# Patient Record
Sex: FEMALE | Race: WHITE
[De-identification: names, ages, dates, MRNs, and addresses within clinical notes are randomized per-mention and may not be internally consistent; named-entity substitution may affect disease eponyms.]

---

## 2020-10-03 ENCOUNTER — HOSPITAL ENCOUNTER (INPATIENT)
Dept: HOSPITAL 33 - ED | Age: 75
LOS: 7 days | Discharge: HOME HEALTH | DRG: 640 | End: 2020-10-10
Attending: INTERNAL MEDICINE | Admitting: INTERNAL MEDICINE
Payer: MEDICARE

## 2020-10-03 DIAGNOSIS — E78.00: ICD-10-CM

## 2020-10-03 DIAGNOSIS — E11.69: ICD-10-CM

## 2020-10-03 DIAGNOSIS — R53.1: ICD-10-CM

## 2020-10-03 DIAGNOSIS — N17.9: ICD-10-CM

## 2020-10-03 DIAGNOSIS — I10: ICD-10-CM

## 2020-10-03 DIAGNOSIS — E87.2: Primary | ICD-10-CM

## 2020-10-03 DIAGNOSIS — Z79.899: ICD-10-CM

## 2020-10-03 DIAGNOSIS — K85.90: ICD-10-CM

## 2020-10-03 DIAGNOSIS — E86.0: ICD-10-CM

## 2020-10-03 LAB
ALBUMIN SERPL-MCNC: 3.1 G/DL (ref 3.5–5)
ALBUMIN SERPL-MCNC: 4.3 G/DL (ref 3.5–5)
ALP SERPL-CCNC: 258 U/L (ref 38–126)
ALP SERPL-CCNC: 372 U/L (ref 38–126)
ALT SERPL-CCNC: 20 U/L (ref 0–35)
ALT SERPL-CCNC: 27 U/L (ref 0–35)
ANION GAP SERPL CALC-SCNC: 18.3 MEQ/L (ref 5–15)
ANION GAP SERPL CALC-SCNC: 26.1 MEQ/L (ref 5–15)
AST SERPL QL: 20 U/L (ref 14–36)
AST SERPL QL: 26 U/L (ref 14–36)
BASOPHILS # BLD AUTO: 0.03 10*3/UL (ref 0–0.4)
BASOPHILS NFR BLD AUTO: 0.3 % (ref 0–0.4)
BILIRUB BLD-MCNC: 0.3 MG/DL (ref 0.2–1.3)
BILIRUB BLD-MCNC: 0.4 MG/DL (ref 0.2–1.3)
BUN SERPL-MCNC: 101 MG/DL (ref 7–17)
BUN SERPL-MCNC: 91 MG/DL (ref 7–17)
CALCIUM SPEC-MCNC: 10.1 MG/DL (ref 8.4–10.2)
CALCIUM SPEC-MCNC: 8.3 MG/DL (ref 8.4–10.2)
CHLORIDE SERPL-SCNC: 111 MMOL/L (ref 98–107)
CHLORIDE SERPL-SCNC: 99 MMOL/L (ref 98–107)
CO2 SERPL-SCNC: 13 MMOL/L (ref 22–30)
CO2 SERPL-SCNC: 14 MMOL/L (ref 22–30)
CREAT SERPL-MCNC: 10.32 MG/DL (ref 0.52–1.04)
CREAT SERPL-MCNC: 8.34 MG/DL (ref 0.52–1.04)
EOSINOPHIL # BLD AUTO: 0.1 10*3/UL (ref 0–0.5)
GFR SERPLBLD BASED ON 1.73 SQ M-ARVRAT: 3.9 ML/MIN
GFR SERPLBLD BASED ON 1.73 SQ M-ARVRAT: 5 ML/MIN
GLUCOSE SERPL-MCNC: 107 MG/DL (ref 74–106)
GLUCOSE SERPL-MCNC: 173 MG/DL (ref 74–106)
GLUCOSE UR-MCNC: 50 MG/DL
HCT VFR BLD AUTO: 33.3 % (ref 35–47)
HGB BLD-MCNC: 10.3 GM/DL (ref 12–16)
LIPASE SERPL-CCNC: 1055 U/L (ref 23–300)
LYMPHOCYTES # SPEC AUTO: 1.63 10*3/UL (ref 1–4.6)
MCH RBC QN AUTO: 30.4 PG (ref 26–32)
MCHC RBC AUTO-ENTMCNC: 30.9 G/DL (ref 32–36)
MONOCYTES # BLD AUTO: 0.39 10*3/UL (ref 0–1.3)
PLATELET # BLD AUTO: 295 K/MM3 (ref 150–450)
POTASSIUM SERPLBLD-SCNC: 4.2 MMOL/L (ref 3.5–5.1)
POTASSIUM SERPLBLD-SCNC: 4.5 MMOL/L (ref 3.5–5.1)
PROT SERPL-MCNC: 5.6 G/DL (ref 6.3–8.2)
PROT SERPL-MCNC: 7.4 G/DL (ref 6.3–8.2)
PROT UR STRIP-MCNC: 100 MG/DL
RBC # BLD AUTO: 3.39 M/MM3 (ref 4.1–5.4)
RBC #/AREA URNS HPF: (no result) /HPF (ref 0–2)
SODIUM SERPL-SCNC: 135 MMOL/L (ref 137–145)
SODIUM SERPL-SCNC: 138 MMOL/L (ref 137–145)
WBC # BLD AUTO: 9.9 K/MM3 (ref 4–10.5)
WBC #/AREA URNS HPF: (no result) /HPF (ref 0–5)

## 2020-10-03 PROCEDURE — 74176 CT ABD & PELVIS W/O CONTRAST: CPT

## 2020-10-03 PROCEDURE — 99285 EMERGENCY DEPT VISIT HI MDM: CPT

## 2020-10-03 PROCEDURE — 85027 COMPLETE CBC AUTOMATED: CPT

## 2020-10-03 PROCEDURE — 83036 HEMOGLOBIN GLYCOSYLATED A1C: CPT

## 2020-10-03 PROCEDURE — 94760 N-INVAS EAR/PLS OXIMETRY 1: CPT

## 2020-10-03 PROCEDURE — 83605 ASSAY OF LACTIC ACID: CPT

## 2020-10-03 PROCEDURE — 96375 TX/PRO/DX INJ NEW DRUG ADDON: CPT

## 2020-10-03 PROCEDURE — 96374 THER/PROPH/DIAG INJ IV PUSH: CPT

## 2020-10-03 PROCEDURE — 36600 WITHDRAWAL OF ARTERIAL BLOOD: CPT

## 2020-10-03 PROCEDURE — 82803 BLOOD GASES ANY COMBINATION: CPT

## 2020-10-03 PROCEDURE — 81001 URINALYSIS AUTO W/SCOPE: CPT

## 2020-10-03 PROCEDURE — 83690 ASSAY OF LIPASE: CPT

## 2020-10-03 PROCEDURE — 96360 HYDRATION IV INFUSION INIT: CPT

## 2020-10-03 PROCEDURE — 36000 PLACE NEEDLE IN VEIN: CPT

## 2020-10-03 PROCEDURE — 85025 COMPLETE CBC W/AUTO DIFF WBC: CPT

## 2020-10-03 PROCEDURE — 83735 ASSAY OF MAGNESIUM: CPT

## 2020-10-03 PROCEDURE — 80053 COMPREHEN METABOLIC PANEL: CPT

## 2020-10-03 PROCEDURE — 99291 CRITICAL CARE FIRST HOUR: CPT

## 2020-10-03 PROCEDURE — 94762 N-INVAS EAR/PLS OXIMTRY CONT: CPT

## 2020-10-03 PROCEDURE — 36415 COLL VENOUS BLD VENIPUNCTURE: CPT

## 2020-10-03 PROCEDURE — 82150 ASSAY OF AMYLASE: CPT

## 2020-10-03 PROCEDURE — 84484 ASSAY OF TROPONIN QUANT: CPT

## 2020-10-03 PROCEDURE — 87086 URINE CULTURE/COLONY COUNT: CPT

## 2020-10-03 PROCEDURE — 82375 ASSAY CARBOXYHB QUANT: CPT

## 2020-10-03 PROCEDURE — 82962 GLUCOSE BLOOD TEST: CPT

## 2020-10-03 PROCEDURE — 87040 BLOOD CULTURE FOR BACTERIA: CPT

## 2020-10-03 PROCEDURE — 71045 X-RAY EXAM CHEST 1 VIEW: CPT

## 2020-10-03 RX ADMIN — METOPROLOL SUCCINATE SCH: 50 TABLET, EXTENDED RELEASE ORAL at 21:01

## 2020-10-03 RX ADMIN — ONDANSETRON PRN MG: 2 INJECTION, SOLUTION INTRAMUSCULAR; INTRAVENOUS at 23:08

## 2020-10-03 RX ADMIN — ONDANSETRON PRN MG: 2 INJECTION, SOLUTION INTRAMUSCULAR; INTRAVENOUS at 16:43

## 2020-10-03 RX ADMIN — METOPROLOL SUCCINATE SCH: 50 TABLET, EXTENDED RELEASE ORAL at 14:35

## 2020-10-03 RX ADMIN — ASPIRIN SCH: 81 TABLET, COATED ORAL at 14:35

## 2020-10-03 RX ADMIN — ACETAMINOPHEN PRN MG: 325 TABLET ORAL at 16:43

## 2020-10-03 NOTE — ERPHSYRPT
- History of Present Illness


Time Seen by Provider: 10/03/20 11:11


Source: patient


Exam Limitations: no limitations


Patient Subjective Stated Complaint: Pt has been sick for approx 3 weeks, been 

vomiting for about a week, diarhea, body aches, dizzy, last intake yesterday v

adonay minimal,


Triage Nursing Assessment: Pt brought to the ER by her daughter, A&O, weak, 

dizzy, nauseaous, denies pain, hypotensive, pulses normal, skin n/c/d


Physician History: 





Pt has been sick for approx 3 weeks, been vomiting for about a week, diarhea, 

body aches, dizzy, last intake yesterday very minimal, denies any fever 

shortness of breath or chest pain





Timing/Duration: week(s) (three)


Severity: moderate


Associated Symptoms: nausea, vomiting, abdominal pain, loss of appetite, 

weakness


Allergies/Adverse Reactions: 








No Known Drug Allergies Allergy (Verified 10/03/20 10:37)


   





Home Medications: 








Aspirin EC 81 mg*** [Ecotrin 81 mg***] 81 mg PO DAILY 11/01/13 [History]


Metformin HCl 1000 mg [Glucophage 1000 MG] 1,000 mg PO BID 11/01/13 [History]


Metoprolol Succinate 50 mg*** [Toprol Xl 50 MG***] 50 mg PO BID 11/01/13 

[History]


Glucosam/Chondr/Collagn/Hyalur [Glucosamine & Chondroitin Cap] 1 tab PO BID 11 /04/13 [History]


Simvastatin 40 mg PO DAILY 11/04/13 [History]


Empagliflozin [Jardiance] 10 mg PO QAM 10/03/20 [History]


Lisinopril/Hydrochlorothiazide [Lisinopril-Hctz 10-12.5 mg Tab] 1 tab PO DAILY 

10/03/20 [History]


Ondansetron HCl 4 mg SL Q8H 10/03/20 [History]


allopurinoL [Allopurinol] 300 mg PO BID 10/03/20 [History]





Hx Influenza Vaccination/Date Given: No


Hx Pneumococcal Vaccination/Date Given: No





Travel Risk





- International Travel


Have you traveled outside of the country in past 3 weeks: No





- Coronavirus Screening


Are you exhibiting any of the following symptoms?: Yes


Symptoms: Vomiting/Diarrhea, Headaches/Body Aches/Fatigue


Close contact with a COVID-19 positive Pt in past 14-21 Days: No





- Review of Systems


Constitutional: Weakness, No Fever, No Chills


Eyes: No Symptoms


Ears, Nose, & Throat: No Symptoms


Respiratory: No Cough, No Dyspnea


Cardiac: No Chest Pain, No Edema, No Syncope


Abdominal/Gastrointestinal: Abdominal Pain, Nausea, Vomiting, Appetite Changes, 

No Diarrhea


Genitourinary Symptoms: No Dysuria


Musculoskeletal: No Back Pain, No Neck Pain


Skin: No Rash


Neurological: No Dizziness, No Focal Weakness, No Sensory Changes


Psychological: No Symptoms


Endocrine: No Symptoms


All Other Systems: Reviewed and Negative





- Past Medical History


Pertinent Past Medical History: Yes


Neurological History: No Pertinent History


ENT History: No Pertinent History


Cardiac History: High Cholesterol, Hypertension


Respiratory History: No Pertinent History


Endocrine Medical History: Diabetes Type II, Other


Musculoskeletal History: Osteoarthritis


GI Medical History: Gallbladder Disease


 History: No Pertinent History


Psycho-Social History: No Pertinent History


Female Reproductive Disorders: Abnormal Uterine Bleeding


Other Medical History: liver enlarged and thryoid nodules





- Past Surgical History


Past Surgical History: Yes


Neuro Surgical History: No Pertinent History


Cardiac: No Pertinent History


Respiratory: No Pertinent History


Gastrointestinal: Cholecystectomy


Genitourinary: No Pertinent History


Musculoskeletal: No Pertinent History


Female Surgical History: Hysterectomy





- Social History


Smoking Status: Never smoker


Exposure to second hand smoke: No


Drug Use: none


Patient Lives Alone: Yes





- Female History


Hx Pregnant Now: No





- Nursing Vital Signs


Nursing Vital Signs: 


                               Initial Vital Signs











Temperature  98.3 F   10/03/20 10:22


 


Pulse Rate  78   10/03/20 10:22


 


Blood Pressure  88/45   10/03/20 10:22


 


O2 Sat by Pulse Oximetry  100   10/03/20 10:22








                                   Pain Scale











Pain Intensity                 0

















- Physical Exam


General Appearance: no apparent distress, alert


Eye Exam: PERRL/EOMI, eyes nml inspection


Ears, Nose, Throat Exam: normal ENT inspection, TMs normal, pharynx normal, 

moist mucous membranes


Neck Exam: normal inspection, non-tender, supple, full range of motion


Respiratory Exam: normal breath sounds, lungs clear, No respiratory distress


Cardiovascular Exam: regular rate/rhythm, normal heart sounds, normal peripheral

 pulses


Gastrointestinal/Abdomen Exam: soft, normal bowel sounds, No tenderness, No mass


Back Exam: normal inspection, normal range of motion, No CVA tenderness, No 

vertebral tenderness


Extremity Exam: normal inspection, normal range of motion, pelvis stable


Neurologic Exam: alert, oriented x 3, cooperative, normal mood/affect, nml 

cerebellar function, nml station & gait, sensation nml, No motor deficits


Skin Exam: normal color, warm, dry, No rash


Lymphatic Exam: No adenopathy


SpO2: 99





- Course


Nursing assessment & vital signs reviewed: Yes


Ordered Tests: 


                               Active Orders 24 hr











 Category Date Time Status


 


 IV Insertion STAT Care  10/03/20 11:04 Active


 


 IV Insertion-2nd Peripheral STAT Care  10/03/20 11:04 Active


 


 CBC W DIFF Stat Lab  10/03/20 11:00 Completed


 


 CMP Stat Lab  10/03/20 11:00 Completed


 


 CULTURE,URINE Stat Lab  10/03/20 11:02 Received


 


 LIPASE Stat Lab  10/03/20 11:00 Completed


 


 Lactic Acid Stat Lab  10/03/20 11:03 Completed


 


 TROPONIN Q3H Lab  10/03/20 11:00 Completed


 


 TROPONIN Q3H Lab  10/03/20 13:30 Ordered


 


 TROPONIN Q3H Lab  10/03/20 16:30 Ordered


 


 TROPONIN Q3H Lab  10/03/20 19:30 Ordered


 


 TROPONIN Q3H Lab  10/03/20 22:30 Ordered


 


 UA W/RFX UR CULTURE Stat Lab  10/03/20 11:02 Completed


 


 Transfer Order Routine Transfer  10/03/20 Ordered








Medication Summary











Generic Name Dose Route Start Last Admin





  Trade Name Freq  PRN Reason Stop Dose Admin


 


Sodium Chloride  1,000 mls @ 999 mls/hr  10/03/20 11:51  10/03/20 11:52





  Sodium Chloride 0.9% 1000 Ml  IV  10/03/20 12:51  999 mls/hr





  .Q1H1M STA   Administration














Discontinued Medications














Generic Name Dose Route Start Last Admin





  Trade Name Freq  PRN Reason Stop Dose Admin


 


Sodium Chloride  1,000 mls @ 999 mls/hr  10/03/20 10:20  10/03/20 11:50





  Sodium Chloride 0.9% 1000 Ml  IV  10/03/20 11:20  Infused





  .Q1H1M STA   Infusion


 


Sodium Chloride  Confirm  10/03/20 10:46 





  Sodium Chloride 0.9% 1000 Ml  Administered  10/03/20 10:47 





  Dose  





  1,000 mls @ ud  





  .ROUTE  





  .STK-MED ONE  


 


Sodium Chloride  Confirm  10/03/20 11:32 





  Sodium Chloride 0.9% 1000 Ml  Administered  10/03/20 11:33 





  Dose  





  1,000 mls @ ud  





  .ROUTE  





  .STK-MED ONE  


 


Ondansetron HCl  4 mg  10/03/20 10:20  10/03/20 10:49





  Zofran 4 Mg/2 Ml Vial**  IV  10/03/20 10:21  4 mg





  STAT ONE   Administration


 


Ondansetron HCl  Confirm  10/03/20 10:46 





  Zofran 4 Mg/2 Ml Vial**  Administered  10/03/20 10:47 





  Dose  





  4 mg  





  .ROUTE  





  .STK-MED ONE  


 


Pantoprazole Sodium  40 mg  10/03/20 10:20  10/03/20 10:49





  Protonix 40 Mg Iv***  IV  10/03/20 10:21  40 mg





  STAT ONE   Administration


 


Pantoprazole Sodium  Confirm  10/03/20 10:46 





  Protonix 40 Mg Iv***  Administered  10/03/20 10:47 





  Dose  





  40 mg  





  IV  





  .K-MED ONE  











Lab/Rad Data: 


                           Laboratory Result Diagrams





                                 10/03/20 11:00 





                                 10/03/20 11:00 





                               Laboratory Results











  10/03/20 10/03/20 10/03/20 Range/Units





  11:03 11:02 11:00 


 


WBC     (4.0-10.5)  K/mm3


 


RBC     (4.1-5.4)  M/mm3


 


Hgb     (12.0-16.0)  gm/dl


 


Hct     (35-47)  %


 


MCV     ()  fl


 


MCH     (26-32)  pg


 


MCHC     (32-36)  g/dl


 


RDW     (11.5-14.0)  %


 


Plt Count     (150-450)  K/mm3


 


MPV     (7.5-11.0)  fl


 


Gran %     (36.0-66.0)  %


 


Eos # (Auto)     (0-0.5)  


 


Absolute Lymphs (auto)     (1.0-4.6)  


 


Absolute Monos (auto)     (0.0-1.3)  


 


Lymphocytes %     (24.0-44.0)  %


 


Monocytes %     (0.0-12.0)  %


 


Eosinophils %     (0.00-5.0)  %


 


Basophils %     (0.0-0.4)  %


 


Absolute Granulocytes     (1.4-6.9)  


 


Basophils #     (0-0.4)  


 


Sodium     (137-145)  mmol/L


 


Potassium     (3.5-5.1)  mmol/L


 


Chloride     ()  mmol/L


 


Carbon Dioxide     (22-30)  mmol/L


 


Anion Gap     (5-15)  MEQ/L


 


BUN     (7-17)  mg/dL


 


Creatinine     (0.52-1.04)  mg/dL


 


Estimated GFR     ML/MIN


 


Glucose     ()  mg/dL


 


Lactic Acid  1.6    (0.4-2.0)  


 


Calcium     (8.4-10.2)  mg/dL


 


Total Bilirubin     (0.2-1.3)  mg/dL


 


AST     (14-36)  U/L


 


ALT     (0-35)  U/L


 


Alkaline Phosphatase     ()  U/L


 


Troponin I    0.016  (0.000-0.034)  ng/mL


 


Serum Total Protein     (6.3-8.2)  g/dL


 


Albumin     (3.5-5.0)  g/dL


 


Lipase     ()  U/L


 


Urine Color   YELLOW   (YELLOW)  


 


Urine Appearance   SLIGHTLY CLOUDY   (CLEAR)  


 


Urine pH   5.0   (5-6)  


 


Ur Specific Gravity   1.016   (1.005-1.025)  


 


Urine Protein   100   (Negative)  


 


Urine Ketones   TRACE   (NEGATIVE)  


 


Urine Blood   MODERATE   (0-5)  Adonay/ul


 


Urine Nitrite   NEGATIVE   (NEGATIVE)  


 


Urine Bilirubin   SMALL   (NEGATIVE)  


 


Urine Urobilinogen   2   (0-1)  mg/dL


 


Ur Leukocyte Esterase   SMALL   (NEGATIVE)  


 


Urine WBC (Auto)   6-10   (0-5)  /HPF


 


Urine RBC (Auto)   0-2   (0-2)  /HPF


 


U Hyaline Cast (Auto)   6-10   (0-2)  /LPF


 


U Epithel Cells (Auto)   RARE   (FEW)  /HPF


 


Urine Bacteria (Auto)   FEW   (NEGATIVE)  /HPF


 


Urine Mucus (Auto)   SLIGHT   (NEGATIVE)  /HPF


 


Urine Culture Reflexed   YES   (NO)  


 


Urine Glucose   50   (NEGATIVE)  mg/dL














  10/03/20 10/03/20 Range/Units





  11:00 11:00 


 


WBC   9.9  (4.0-10.5)  K/mm3


 


RBC   3.39 L  (4.1-5.4)  M/mm3


 


Hgb   10.3 L  (12.0-16.0)  gm/dl


 


Hct   33.3 L  (35-47)  %


 


MCV   98.2  ()  fl


 


MCH   30.4  (26-32)  pg


 


MCHC   30.9 L  (32-36)  g/dl


 


RDW   15.3 H  (11.5-14.0)  %


 


Plt Count   295  (150-450)  K/mm3


 


MPV   11.1 H  (7.5-11.0)  fl


 


Gran %   78.4 H  (36.0-66.0)  %


 


Eos # (Auto)   0.10  (0-0.5)  


 


Absolute Lymphs (auto)   1.63  (1.0-4.6)  


 


Absolute Monos (auto)   0.39  (0.0-1.3)  


 


Lymphocytes %   16.4 L  (24.0-44.0)  %


 


Monocytes %   3.9  (0.0-12.0)  %


 


Eosinophils %   1.0  (0.00-5.0)  %


 


Basophils %   0.3  (0.0-0.4)  %


 


Absolute Granulocytes   7.78 H  (1.4-6.9)  


 


Basophils #   0.03  (0-0.4)  


 


Sodium  135 L   (137-145)  mmol/L


 


Potassium  4.5   (3.5-5.1)  mmol/L


 


Chloride  99   ()  mmol/L


 


Carbon Dioxide  14 L*   (22-30)  mmol/L


 


Anion Gap  26.1 H   (5-15)  MEQ/L


 


BUN  101 H   (7-17)  mg/dL


 


Creatinine  10.32 H   (0.52-1.04)  mg/dL


 


Estimated GFR  3.9   ML/MIN


 


Glucose  173 H   ()  mg/dL


 


Lactic Acid    (0.4-2.0)  


 


Calcium  10.1   (8.4-10.2)  mg/dL


 


Total Bilirubin  0.40   (0.2-1.3)  mg/dL


 


AST  26   (14-36)  U/L


 


ALT  27   (0-35)  U/L


 


Alkaline Phosphatase  372 H   ()  U/L


 


Troponin I    (0.000-0.034)  ng/mL


 


Serum Total Protein  7.4   (6.3-8.2)  g/dL


 


Albumin  4.3   (3.5-5.0)  g/dL


 


Lipase  1055 H   ()  U/L


 


Urine Color    (YELLOW)  


 


Urine Appearance    (CLEAR)  


 


Urine pH    (5-6)  


 


Ur Specific Gravity    (1.005-1.025)  


 


Urine Protein    (Negative)  


 


Urine Ketones    (NEGATIVE)  


 


Urine Blood    (0-5)  Adonay/ul


 


Urine Nitrite    (NEGATIVE)  


 


Urine Bilirubin    (NEGATIVE)  


 


Urine Urobilinogen    (0-1)  mg/dL


 


Ur Leukocyte Esterase    (NEGATIVE)  


 


Urine WBC (Auto)    (0-5)  /HPF


 


Urine RBC (Auto)    (0-2)  /HPF


 


U Hyaline Cast (Auto)    (0-2)  /LPF


 


U Epithel Cells (Auto)    (FEW)  /HPF


 


Urine Bacteria (Auto)    (NEGATIVE)  /HPF


 


Urine Mucus (Auto)    (NEGATIVE)  /HPF


 


Urine Culture Reflexed    (NO)  


 


Urine Glucose    (NEGATIVE)  mg/dL














- Progress


Progress: improved


Discussed with : Beni


Will see patient in: hospital (full admit)


Counseled pt/family regarding: lab results, diagnosis, need for follow-up





- Departure


Departure Disposition: In-patient Admission


Clinical Impression: 


 Dehydration, Metabolic acidosis due to diabetes mellitus





Acute renal failure syndrome


Qualifiers:


 Acute renal failure type: unspecified Qualified Code(s): N17.9 - Acute kidney f

ailure, unspecified





Condition: Fair


Critical Care Time: Yes


Critical Care Time(excluding separately billable procedures): Critical 30-74 

mins


Referrals: 


MARCIANO GIL MD [Primary Care Provider] -

## 2020-10-03 NOTE — PCM.HP
History of Present Illness





- Chief Complaint


Chief Complaint: DEHYDRATION, ARF


History of Present Illness: 


 is a 75 year old female.Pt brought to the ER by her daughter, A&O, 

weak, dizzy, nauseaous, denies pain, 





Pt has been sick for approx 3 weeks, been vomiting for about a week, diarhea, 

body aches, dizzy, last intake yesterday very minimal, denies any fever 

shortness of breath or chest pain





Timing/Duration: week(s) (three)


Severity: moderate


Associated Symptoms: nausea, vomiting, abdominal pain, loss of appetite, 

weakness








- Review of Systems


Constitutional: Lethargy, Weakness, No Fever, No Chills


Eyes: No Symptoms


Ears, Nose, & Throat: No Symptoms


Respiratory: No Cough, No Short Of Breath


Cardiac: No Chest Pain, No Edema, No Syncope


Abdominal/Gastrointestinal: No Abdominal Pain, No Nausea, No Vomiting, No 

Diarrhea


Genitourinary Symptoms: No Dysuria


Musculoskeletal: No Back Pain, No Neck Pain


Skin: No Rash


Neurological: No Dizziness, No Focal Weakness, No Sensory Changes


Psychological: No Symptoms


Endocrine: No Symptoms


Hematologic/Lymphatic: No Symptoms


Immunological/Allergic: No Symptoms





Medications & Allergies


Home Medications: 


                              Home Medication List





Aspirin EC 81 mg*** [Ecotrin 81 mg***] 81 mg PO DAILY 11/01/13 [History 

Confirmed 10/03/20]


Metformin HCl 1000 mg [Glucophage 1000 MG] 1,000 mg PO BID 11/01/13 [History 

Confirmed 10/03/20]


Metoprolol Succinate 50 mg*** [Toprol Xl 50 MG***] 50 mg PO BID 11/01/13 

[History Confirmed 10/03/20]


Empagliflozin [Jardiance] 10 mg PO QAM 10/03/20 [History Confirmed 10/03/20]


Lisinopril/Hydrochlorothiazide [Lisinopril-Hctz 10-12.5 mg Tab] 1 tab PO DAILY 

10/03/20 [History Confirmed 10/03/20]


Ondansetron HCl 4 mg SL Q8H 10/03/20 [History Confirmed 10/03/20]


Simvastatin 40 mg PO HS 10/03/20 [History Confirmed 10/03/20]


allopurinoL [Allopurinol] 300 mg PO BID 10/03/20 [History Confirmed 10/03/20]








Allergies/Adverse Reactions: 


                                    Allergies











Allergy/AdvReac Type Severity Reaction Status Date / Time


 


No Known Drug Allergies Allergy   Verified 10/03/20 10:37














- Past Medical History


Past Medical History: Yes


Neurological History: No Pertinent History


ENT History: No Pertinent History


Cardiac History: High Cholesterol, Hypertension


Respiratory History: No Pertinent History


Endocrine Medical History: Diabetes Type II, Other


Musculoskelatal History: Osteoarthritis


GI Medical History: Gallbladder Disease


 History: No Pertinent History


Pyscho-Social History: No Pertinent History


Reproductive Disorders: Abnormal Uterine Bleeding


Comment: liver enlarged and thryoid nodules





- Female History


Are you pregnant now?: No





- Past Surgical History


Past Surgical History: Yes


Neuro Surgical History: No Pertinent History


Cardiac History: No Pertinent History


Respiratory Surgery: No Pertinent History


GI Surgical History: Cholecystectomy


Genitourinary Surgical Hx: No Pertinent History


Musculskeletal Surgical Hx: No Pertinent History


Female Surgical History: Hysterectomy





- Social History


Smoking Status: Never smoker


Exposure to second hand smoke: No


Alcohol: None


Drug Use: none





- Physical Exam


Vital Signs: 


                               Vital Signs - 24 hr











  Temp Pulse Resp BP Pulse Ox


 


 10/03/20 13:31      94 L


 


 10/03/20 12:20  98.5 F  69  16  91/44  98


 


 10/03/20 12:01   68  16  84/39  94 L


 


 10/03/20 11:56      99


 


 10/03/20 11:05   72  18  83/40  99


 


 10/03/20 10:22  98.3 F  78   88/45  100











General Appearance: no apparent distress, alert


Neurologic Exam: alert, oriented x 3, cooperative, normal mood/affect, nml 

cerebellar function, nml station & gait, sensation nml, No motor deficits


Eye Exam: PERRL/EOMI, eyes nml inspection


Ears, Nose, Throat Exam: normal ENT inspection, TMs normal, pharynx normal, 

moist mucous membranes


Neck Exam: normal inspection, non-tender, supple, full range of motion


Respiratory Exam: normal breath sounds, lungs clear, No respiratory distress


Cardiovascular Exam: regular rate/rhythm, normal heart sounds, normal peripheral

pulses


Gastrointestinal/Abdomen Exam: soft, normal bowel sounds, No tenderness, No mass


Back Exam: normal inspection, normal range of motion, No CVA tenderness, No 

vertebral tenderness


Extremity Exam: normal inspection, normal range of motion, pelvis stable


Skin Exam: normal color, warm, dry, No rash


Lymphatic Exam: No adenopathy





Results





- Labs


Lab/Micro Results: 


                            Lab Results-Last 24 Hours











  10/03/20 10/03/20 10/03/20 Range/Units





  11:00 11:00 11:00 


 


WBC  9.9    (4.0-10.5)  K/mm3


 


RBC  3.39 L    (4.1-5.4)  M/mm3


 


Hgb  10.3 L    (12.0-16.0)  gm/dl


 


Hct  33.3 L    (35-47)  %


 


MCV  98.2    ()  fl


 


MCH  30.4    (26-32)  pg


 


MCHC  30.9 L    (32-36)  g/dl


 


RDW  15.3 H    (11.5-14.0)  %


 


Plt Count  295    (150-450)  K/mm3


 


MPV  11.1 H    (7.5-11.0)  fl


 


Gran %  78.4 H    (36.0-66.0)  %


 


Eos # (Auto)  0.10    (0-0.5)  


 


Absolute Lymphs (auto)  1.63    (1.0-4.6)  


 


Absolute Monos (auto)  0.39    (0.0-1.3)  


 


Lymphocytes %  16.4 L    (24.0-44.0)  %


 


Monocytes %  3.9    (0.0-12.0)  %


 


Eosinophils %  1.0    (0.00-5.0)  %


 


Basophils %  0.3    (0.0-0.4)  %


 


Absolute Granulocytes  7.78 H    (1.4-6.9)  


 


Basophils #  0.03    (0-0.4)  


 


Sodium   135 L   (137-145)  mmol/L


 


Potassium   4.5   (3.5-5.1)  mmol/L


 


Chloride   99   ()  mmol/L


 


Carbon Dioxide   14 L*   (22-30)  mmol/L


 


Anion Gap   26.1 H   (5-15)  MEQ/L


 


BUN   101 H   (7-17)  mg/dL


 


Creatinine   10.32 H   (0.52-1.04)  mg/dL


 


Estimated GFR   3.9   ML/MIN


 


Glucose   173 H   ()  mg/dL


 


Lactic Acid     (0.4-2.0)  


 


Calcium   10.1   (8.4-10.2)  mg/dL


 


Total Bilirubin   0.40   (0.2-1.3)  mg/dL


 


AST   26   (14-36)  U/L


 


ALT   27   (0-35)  U/L


 


Alkaline Phosphatase   372 H   ()  U/L


 


Troponin I    0.016  (0.000-0.034)  ng/mL


 


Serum Total Protein   7.4   (6.3-8.2)  g/dL


 


Albumin   4.3   (3.5-5.0)  g/dL


 


Lipase   1055 H   ()  U/L


 


Urine Color     (YELLOW)  


 


Urine Appearance     (CLEAR)  


 


Urine pH     (5-6)  


 


Ur Specific Gravity     (1.005-1.025)  


 


Urine Protein     (Negative)  


 


Urine Ketones     (NEGATIVE)  


 


Urine Blood     (0-5)  Adonay/ul


 


Urine Nitrite     (NEGATIVE)  


 


Urine Bilirubin     (NEGATIVE)  


 


Urine Urobilinogen     (0-1)  mg/dL


 


Ur Leukocyte Esterase     (NEGATIVE)  


 


Urine WBC (Auto)     (0-5)  /HPF


 


Urine RBC (Auto)     (0-2)  /HPF


 


U Hyaline Cast (Auto)     (0-2)  /LPF


 


U Epithel Cells (Auto)     (FEW)  /HPF


 


Urine Bacteria (Auto)     (NEGATIVE)  /HPF


 


Urine Mucus (Auto)     (NEGATIVE)  /HPF


 


Urine Culture Reflexed     (NO)  


 


Urine Glucose     (NEGATIVE)  mg/dL














  10/03/20 10/03/20 Range/Units





  11:02 11:03 


 


WBC    (4.0-10.5)  K/mm3


 


RBC    (4.1-5.4)  M/mm3


 


Hgb    (12.0-16.0)  gm/dl


 


Hct    (35-47)  %


 


MCV    ()  fl


 


MCH    (26-32)  pg


 


MCHC    (32-36)  g/dl


 


RDW    (11.5-14.0)  %


 


Plt Count    (150-450)  K/mm3


 


MPV    (7.5-11.0)  fl


 


Gran %    (36.0-66.0)  %


 


Eos # (Auto)    (0-0.5)  


 


Absolute Lymphs (auto)    (1.0-4.6)  


 


Absolute Monos (auto)    (0.0-1.3)  


 


Lymphocytes %    (24.0-44.0)  %


 


Monocytes %    (0.0-12.0)  %


 


Eosinophils %    (0.00-5.0)  %


 


Basophils %    (0.0-0.4)  %


 


Absolute Granulocytes    (1.4-6.9)  


 


Basophils #    (0-0.4)  


 


Sodium    (137-145)  mmol/L


 


Potassium    (3.5-5.1)  mmol/L


 


Chloride    ()  mmol/L


 


Carbon Dioxide    (22-30)  mmol/L


 


Anion Gap    (5-15)  MEQ/L


 


BUN    (7-17)  mg/dL


 


Creatinine    (0.52-1.04)  mg/dL


 


Estimated GFR    ML/MIN


 


Glucose    ()  mg/dL


 


Lactic Acid   1.6  (0.4-2.0)  


 


Calcium    (8.4-10.2)  mg/dL


 


Total Bilirubin    (0.2-1.3)  mg/dL


 


AST    (14-36)  U/L


 


ALT    (0-35)  U/L


 


Alkaline Phosphatase    ()  U/L


 


Troponin I    (0.000-0.034)  ng/mL


 


Serum Total Protein    (6.3-8.2)  g/dL


 


Albumin    (3.5-5.0)  g/dL


 


Lipase    ()  U/L


 


Urine Color  YELLOW   (YELLOW)  


 


Urine Appearance  SLIGHTLY CLOUDY   (CLEAR)  


 


Urine pH  5.0   (5-6)  


 


Ur Specific Gravity  1.016   (1.005-1.025)  


 


Urine Protein  100   (Negative)  


 


Urine Ketones  TRACE   (NEGATIVE)  


 


Urine Blood  MODERATE   (0-5)  [place]/ul


 


Urine Nitrite  NEGATIVE   (NEGATIVE)  


 


Urine Bilirubin  SMALL   (NEGATIVE)  


 


Urine Urobilinogen  2   (0-1)  mg/dL


 


Ur Leukocyte Esterase  SMALL   (NEGATIVE)  


 


Urine WBC (Auto)  6-10   (0-5)  /HPF


 


Urine RBC (Auto)  0-2   (0-2)  /HPF


 


U Hyaline Cast (Auto)  6-10   (0-2)  /LPF


 


U Epithel Cells (Auto)  RARE   (FEW)  /HPF


 


Urine Bacteria (Auto)  FEW   (NEGATIVE)  /HPF


 


Urine Mucus (Auto)  SLIGHT   (NEGATIVE)  /HPF


 


Urine Culture Reflexed  YES   (NO)  


 


Urine Glucose  50   (NEGATIVE)  mg/dL














- Other Procedures and Tests


                               Respiratory Therapy





10/03/20 12:25


Oxygen Nasal Cannula 2 lpm 














Assessment/Plan


(1) Acute renal failure syndrome


Current Visit: Yes   Status: Acute   


Qualifiers: 


   Acute renal failure type: unspecified   Qualified Code(s): N17.9 - Acute 

kidney failure, unspecified   


Assessment & Plan: 


                              Abnormal Lab Results











  10/03/20 10/03/20 Range/Units





  11:00 11:00 


 


RBC  3.39 L   (4.1-5.4)  M/mm3


 


Hgb  10.3 L   (12.0-16.0)  gm/dl


 


Hct  33.3 L   (35-47)  %


 


MCHC  30.9 L   (32-36)  g/dl


 


RDW  15.3 H   (11.5-14.0)  %


 


MPV  11.1 H   (7.5-11.0)  fl


 


Gran %  78.4 H   (36.0-66.0)  %


 


Lymphocytes %  16.4 L   (24.0-44.0)  %


 


Absolute Granulocytes  7.78 H   (1.4-6.9)  


 


Sodium   135 L  (137-145)  mmol/L


 


Carbon Dioxide   14 L*  (22-30)  mmol/L


 


Anion Gap   26.1 H  (5-15)  MEQ/L


 


BUN   101 H  (7-17)  mg/dL


 


Creatinine   10.32 H  (0.52-1.04)  mg/dL


 


Glucose   173 H  ()  mg/dL


 


Alkaline Phosphatase   372 H  ()  U/L


 


Lipase   1055 H  ()  U/L

















(2) Pancreatitis


Current Visit: Yes   Status: Acute   


Qualifiers: 


   Chronicity: acute   Pancreatitis type: unspecified pancreatitis type   Acute 

pancreatitis complication: unspecified   Qualified Code(s): K85.90 - Acute pancr

eatitis without necrosis or infection, unspecified   


Code(s): K85.90 - ACUTE PANCREATITIS WITHOUT NECROSIS OR INFECTION, UNSP   





(3) Type 2 diabetes mellitus


Current Visit: Yes   Status: Acute   


Qualifiers: 


   Diabetes mellitus long term insulin use: without long term use   Diabetes 

mellitus complication status: with ketoacidosis   Diabetes mellitus complication

detail: without coma   Qualified Code(s): E11.10 - Type 2 diabetes mellitus with

ketoacidosis without coma   





(4) Metabolic acidosis due to diabetes mellitus


Current Visit: Yes   Status: Acute   Code(s): E11.69 - TYPE 2 DIABETES MELLITUS 

WITH OTHER SPECIFIED COMPLICATION; E87.2 - ACIDOSIS   





(5) Hypertension


Current Visit: Yes   Status: Chronic   


Qualifiers: 


   Hypertension type: essential hypertension   Qualified Code(s): I10 - [place]

tial (primary) hypertension   


Code(s): I10 - ESSENTIAL (PRIMARY) HYPERTENSION   





(6) Dehydration


Current Visit: Yes   Status: Acute   Code(s): E86.0 - DEHYDRATION

## 2020-10-04 LAB
ALBUMIN SERPL-MCNC: 2.7 G/DL (ref 3.5–5)
ALP SERPL-CCNC: 236 U/L (ref 38–126)
ALT SERPL-CCNC: 17 U/L (ref 0–35)
AMYLASE SERPL-CCNC: 169 U/L (ref 30–110)
ANION GAP SERPL CALC-SCNC: 14.6 MEQ/L (ref 5–15)
AST SERPL QL: 19 U/L (ref 14–36)
BASOPHILS # BLD AUTO: 0.03 10*3/UL (ref 0–0.4)
BASOPHILS NFR BLD AUTO: 0.4 % (ref 0–0.4)
BILIRUB BLD-MCNC: 0.3 MG/DL (ref 0.2–1.3)
BUN SERPL-MCNC: 78 MG/DL (ref 7–17)
CALCIUM SPEC-MCNC: 8.2 MG/DL (ref 8.4–10.2)
CHLORIDE SERPL-SCNC: 119 MMOL/L (ref 98–107)
CO2 SERPL-SCNC: 11 MMOL/L (ref 22–30)
CREAT SERPL-MCNC: 6.92 MG/DL (ref 0.52–1.04)
EOSINOPHIL # BLD AUTO: 0.23 10*3/UL (ref 0–0.5)
GFR SERPLBLD BASED ON 1.73 SQ M-ARVRAT: 6.1 ML/MIN
GLUCOSE SERPL-MCNC: 103 MG/DL (ref 74–106)
HCT VFR BLD AUTO: 26.8 % (ref 35–47)
HGB BLD-MCNC: 8.1 GM/DL (ref 12–16)
LIPASE SERPL-CCNC: 1394 U/L (ref 23–300)
LYMPHOCYTES # SPEC AUTO: 2.16 10*3/UL (ref 1–4.6)
MCH RBC QN AUTO: 30.2 PG (ref 26–32)
MCHC RBC AUTO-ENTMCNC: 30.2 G/DL (ref 32–36)
MONOCYTES # BLD AUTO: 0.51 10*3/UL (ref 0–1.3)
PLATELET # BLD AUTO: 183 K/MM3 (ref 150–450)
POTASSIUM SERPLBLD-SCNC: 4 MMOL/L (ref 3.5–5.1)
PROT SERPL-MCNC: 5.2 G/DL (ref 6.3–8.2)
RBC # BLD AUTO: 2.68 M/MM3 (ref 4.1–5.4)
SODIUM SERPL-SCNC: 141 MMOL/L (ref 137–145)
WBC # BLD AUTO: 6.9 K/MM3 (ref 4–10.5)

## 2020-10-04 RX ADMIN — METOPROLOL SUCCINATE SCH: 50 TABLET, EXTENDED RELEASE ORAL at 09:34

## 2020-10-04 RX ADMIN — ACETAMINOPHEN PRN MG: 325 TABLET ORAL at 14:08

## 2020-10-04 RX ADMIN — ONDANSETRON PRN MG: 2 INJECTION, SOLUTION INTRAMUSCULAR; INTRAVENOUS at 15:18

## 2020-10-04 RX ADMIN — PANTOPRAZOLE SODIUM SCH MG: 40 INJECTION, POWDER, FOR SOLUTION INTRAVENOUS at 09:22

## 2020-10-04 RX ADMIN — ACETAMINOPHEN PRN MG: 325 TABLET ORAL at 06:39

## 2020-10-04 RX ADMIN — ASPIRIN SCH MG: 81 TABLET, COATED ORAL at 09:23

## 2020-10-04 RX ADMIN — ONDANSETRON PRN MG: 2 INJECTION, SOLUTION INTRAMUSCULAR; INTRAVENOUS at 20:55

## 2020-10-04 RX ADMIN — CEFTRIAXONE SCH MLS/HR: 1 INJECTION, SOLUTION INTRAVENOUS at 09:23

## 2020-10-04 RX ADMIN — ONDANSETRON PRN MG: 2 INJECTION, SOLUTION INTRAMUSCULAR; INTRAVENOUS at 09:29

## 2020-10-04 RX ADMIN — METOPROLOL SUCCINATE SCH: 50 TABLET, EXTENDED RELEASE ORAL at 20:56

## 2020-10-04 NOTE — XRAY
Indication: Nausea, vomiting, weakness, and elevated amylase/lipase.



Multiple contiguous axial images obtained through the abdomen and pelvis

without contrast as ordered.



Comparison: None



Lung bases demonstrates fibrosis/scarring and minimal bilateral dependent

atelectasis.  No infiltrate or effusion.  Heart is borderline enlarged.  Small

hiatal hernia.



Noncontrasted stomach and bowel loops appear nonobstructed.  Normal appendix.

Scattered sigmoid diverticulosis.  Head and body of the pancreas appears

prominent/edematous with peripancreatic stranding favoring acute pancreatitis.

 Adjacent duodenal circumferential wall thickening presumed reactive.  Tiny

inferior perihepatic and pelvic free fluid.  No walled off fluid collection or

free air.  Previous cholecystectomy and hysterectomy.



Remaining liver, spleen, adrenal glands, kidneys, ureters, and bladder appear

unremarkable for noncontrast exam.  Mild aortoiliac calcifications without AAA.



Osseous structures demonstrates mild osteopenia, mild/moderate degenerative

spondylosis throughout the thoracolumbar spine, and left L5 spondylolysis with

2-3 mm spondylolisthesis.



Impression:

1.  CT findings favoring acute pancreatitis as detailed with tiny free fluid.

No walled off fluid collection or free air.

2.  Incidental small hiatal hernia, sigmoid diverticulosis, and chronic bony

findings.



Comment: Preliminary interpretation was made by [facility].  No critical discrepancy.

## 2020-10-04 NOTE — PCM.NOTE
Date and Time: 10/04/20  0856





Subjective Assessment: 





doing better, BP is running low, urine output improving. still feels weak and 

dizzy





- Review of Systems


Constitutional: Weakness, No Fever, No Chills


Eyes: No Symptoms


Ears, Nose, & Throat: No Symptoms


Respiratory: No Cough, No Short Of Breath


Cardiac: No Chest Pain, No Edema, No Syncope


Abdominal/Gastrointestinal: No Abdominal Pain, No Nausea, No Vomiting, No 

Diarrhea


Genitourinary Symptoms: No Dysuria


Musculoskeletal: No Back Pain, No Neck Pain


Skin: No Rash


Neurological: No Dizziness, No Focal Weakness, No Sensory Changes


Psychological: No Symptoms


Endocrine: No Symptoms


Hematologic/Lymphatic: No Symptoms


Immunological/Allergic: No Symptoms





Objective Exam


General Appearance: no apparent distress, alert


Neurologic Exam: alert, oriented x 3, cooperative, normal mood/affect, nml 

cerebellar function, sensation nml, No motor deficits


Skin Exam: normal color, warm, dry


Eye Exam: PERRL, EOMI, eyes nml inspection


Ears, Nose, Throat Exam: normal ENT inspection, pharynx normal, moist mucous mem

branes


Neck Exam: normal inspection, non-tender, supple, full range of motion


Respiratory Exam: normal breath sounds, lungs clear, No respiratory distress


Cardiovascular Exam: regular rate/rhythm, normal heart sounds


Gastrointestinal/Abdomen Exam: soft, No tenderness, No mass


Extremity Exam: normal inspection, normal range of motion


Back Exam: normal inspection, normal range of motion, No CVA tenderness, No 

vertebral tenderness


Pelvic Exam: deferred


Rectal Exam: deferred





OBJECTIVE DATA


Vital Signs: 


                               Vital Signs - 24 hr











  Temp Pulse Resp BP Pulse Ox


 


 10/04/20 07:01  98.7 F  67  18  91/44  94 L


 


 10/04/20 06:59      94 L


 


 10/04/20 04:00  97.5 F  79  17  83/43  97


 


 10/03/20 23:47  98.0 F  79  16  89/43  96


 


 10/03/20 21:30   71   87/40 


 


 10/03/20 20:11      94 L


 


 10/03/20 20:00  98.2 F  71  17  89/46  98


 


 10/03/20 16:00  98.1 F  79  18  88/47  93 L


 


 10/03/20 13:31      94 L


 


 10/03/20 12:20  98.5 F  69  16  91/44  98


 


 10/03/20 12:01   68  16  84/39  94 L


 


 10/03/20 11:56      99


 


 10/03/20 11:05   72  18  83/40  99


 


 10/03/20 10:22  98.3 F  78   88/45  100








                        Pain Assessment - Last Documented











Pain Intensity                 5


 


Pain Scale Used                0-10 Pain Scale











Intake and Output: 


                                 Intake & Output











 10/01/20 10/02/20 10/03/20 10/04/20





 11:59 11:59 11:59 11:59


 


Intake Total    9716


 


Output Total    1350


 


Balance    8366


 


Weight   80.286 kg 80.3 kg











Lab Results: 


                            Lab Results-Last 24 Hours











  10/03/20 10/03/20 10/03/20 Range/Units





  11:00 11:00 11:00 


 


WBC  9.9    (4.0-10.5)  K/mm3


 


RBC  3.39 L    (4.1-5.4)  M/mm3


 


Hgb  10.3 L    (12.0-16.0)  gm/dl


 


Hct  33.3 L    (35-47)  %


 


MCV  98.2    ()  fl


 


MCH  30.4    (26-32)  pg


 


MCHC  30.9 L    (32-36)  g/dl


 


RDW  15.3 H    (11.5-14.0)  %


 


Plt Count  295    (150-450)  K/mm3


 


MPV  11.1 H    (7.5-11.0)  fl


 


Gran %  78.4 H    (36.0-66.0)  %


 


Eos # (Auto)  0.10    (0-0.5)  


 


Absolute Lymphs (auto)  1.63    (1.0-4.6)  


 


Absolute Monos (auto)  0.39    (0.0-1.3)  


 


Lymphocytes %  16.4 L    (24.0-44.0)  %


 


Monocytes %  3.9    (0.0-12.0)  %


 


Eosinophils %  1.0    (0.00-5.0)  %


 


Basophils %  0.3    (0.0-0.4)  %


 


Absolute Granulocytes  7.78 H    (1.4-6.9)  


 


Basophils #  0.03    (0-0.4)  


 


Sodium   135 L   (137-145)  mmol/L


 


Potassium   4.5   (3.5-5.1)  mmol/L


 


Chloride   99   ()  mmol/L


 


Carbon Dioxide   14 L*   (22-30)  mmol/L


 


Anion Gap   26.1 H   (5-15)  MEQ/L


 


BUN   101 H   (7-17)  mg/dL


 


Creatinine   10.32 H   (0.52-1.04)  mg/dL


 


Estimated GFR   3.9   ML/MIN


 


Glucose   173 H   ()  mg/dL


 


POC Glucometer     (74 to 106)  mg/dL


 


Hemoglobin A1c     (4.5-6.0)  %


 


Lactic Acid     (0.4-2.0)  


 


Calcium   10.1   (8.4-10.2)  mg/dL


 


Total Bilirubin   0.40   (0.2-1.3)  mg/dL


 


AST   26   (14-36)  U/L


 


ALT   27   (0-35)  U/L


 


Alkaline Phosphatase   372 H   ()  U/L


 


Troponin I    0.016  (0.000-0.034)  ng/mL


 


Serum Total Protein   7.4   (6.3-8.2)  g/dL


 


Albumin   4.3   (3.5-5.0)  g/dL


 


Amylase     ()  U/L


 


Lipase   1055 H   ()  U/L


 


Urine Color     (YELLOW)  


 


Urine Appearance     (CLEAR)  


 


Urine pH     (5-6)  


 


Ur Specific Gravity     (1.005-1.025)  


 


Urine Protein     (Negative)  


 


Urine Ketones     (NEGATIVE)  


 


Urine Blood     (0-5)  Adonay/ul


 


Urine Nitrite     (NEGATIVE)  


 


Urine Bilirubin     (NEGATIVE)  


 


Urine Urobilinogen     (0-1)  mg/dL


 


Ur Leukocyte Esterase     (NEGATIVE)  


 


Urine WBC (Auto)     (0-5)  /HPF


 


Urine RBC (Auto)     (0-2)  /HPF


 


U Hyaline Cast (Auto)     (0-2)  /LPF


 


U Epithel Cells (Auto)     (FEW)  /HPF


 


Urine Bacteria (Auto)     (NEGATIVE)  /HPF


 


Urine Mucus (Auto)     (NEGATIVE)  /HPF


 


Urine Culture Reflexed     (NO)  


 


Urine Glucose     (NEGATIVE)  mg/dL














  10/03/20 10/03/20 10/03/20 Range/Units





  11:02 11:03 14:00 


 


WBC     (4.0-10.5)  K/mm3


 


RBC     (4.1-5.4)  M/mm3


 


Hgb     (12.0-16.0)  gm/dl


 


Hct     (35-47)  %


 


MCV     ()  fl


 


MCH     (26-32)  pg


 


MCHC     (32-36)  g/dl


 


RDW     (11.5-14.0)  %


 


Plt Count     (150-450)  K/mm3


 


MPV     (7.5-11.0)  fl


 


Gran %     (36.0-66.0)  %


 


Eos # (Auto)     (0-0.5)  


 


Absolute Lymphs (auto)     (1.0-4.6)  


 


Absolute Monos (auto)     (0.0-1.3)  


 


Lymphocytes %     (24.0-44.0)  %


 


Monocytes %     (0.0-12.0)  %


 


Eosinophils %     (0.00-5.0)  %


 


Basophils %     (0.0-0.4)  %


 


Absolute Granulocytes     (1.4-6.9)  


 


Basophils #     (0-0.4)  


 


Sodium     (137-145)  mmol/L


 


Potassium     (3.5-5.1)  mmol/L


 


Chloride     ()  mmol/L


 


Carbon Dioxide     (22-30)  mmol/L


 


Anion Gap     (5-15)  MEQ/L


 


BUN     (7-17)  mg/dL


 


Creatinine     (0.52-1.04)  mg/dL


 


Estimated GFR     ML/MIN


 


Glucose     ()  mg/dL


 


POC Glucometer     (74 to 106)  mg/dL


 


Hemoglobin A1c     (4.5-6.0)  %


 


Lactic Acid   1.6   (0.4-2.0)  


 


Calcium     (8.4-10.2)  mg/dL


 


Total Bilirubin     (0.2-1.3)  mg/dL


 


AST     (14-36)  U/L


 


ALT     (0-35)  U/L


 


Alkaline Phosphatase     ()  U/L


 


Troponin I    0.012  (0.000-0.034)  ng/mL


 


Serum Total Protein     (6.3-8.2)  g/dL


 


Albumin     (3.5-5.0)  g/dL


 


Amylase     ()  U/L


 


Lipase     ()  U/L


 


Urine Color  YELLOW    (YELLOW)  


 


Urine Appearance  SLIGHTLY CLOUDY    (CLEAR)  


 


Urine pH  5.0    (5-6)  


 


Ur Specific Gravity  1.016    (1.005-1.025)  


 


Urine Protein  100    (Negative)  


 


Urine Ketones  TRACE    (NEGATIVE)  


 


Urine Blood  MODERATE    (0-5)  Adonay/ul


 


Urine Nitrite  NEGATIVE    (NEGATIVE)  


 


Urine Bilirubin  SMALL    (NEGATIVE)  


 


Urine Urobilinogen  2    (0-1)  mg/dL


 


Ur Leukocyte Esterase  SMALL    (NEGATIVE)  


 


Urine WBC (Auto)  6-10    (0-5)  /HPF


 


Urine RBC (Auto)  0-2    (0-2)  /HPF


 


U Hyaline Cast (Auto)  6-10    (0-2)  /LPF


 


U Epithel Cells (Auto)  RARE    (FEW)  /HPF


 


Urine Bacteria (Auto)  FEW    (NEGATIVE)  /HPF


 


Urine Mucus (Auto)  SLIGHT    (NEGATIVE)  /HPF


 


Urine Culture Reflexed  YES    (NO)  


 


Urine Glucose  50    (NEGATIVE)  mg/dL














  10/03/20 10/03/20 10/03/20 Range/Units





  14:30 16:08 19:25 


 


WBC     (4.0-10.5)  K/mm3


 


RBC     (4.1-5.4)  M/mm3


 


Hgb     (12.0-16.0)  gm/dl


 


Hct     (35-47)  %


 


MCV     ()  fl


 


MCH     (26-32)  pg


 


MCHC     (32-36)  g/dl


 


RDW     (11.5-14.0)  %


 


Plt Count     (150-450)  K/mm3


 


MPV     (7.5-11.0)  fl


 


Gran %     (36.0-66.0)  %


 


Eos # (Auto)     (0-0.5)  


 


Absolute Lymphs (auto)     (1.0-4.6)  


 


Absolute Monos (auto)     (0.0-1.3)  


 


Lymphocytes %     (24.0-44.0)  %


 


Monocytes %     (0.0-12.0)  %


 


Eosinophils %     (0.00-5.0)  %


 


Basophils %     (0.0-0.4)  %


 


Absolute Granulocytes     (1.4-6.9)  


 


Basophils #     (0-0.4)  


 


Sodium    138  (137-145)  mmol/L


 


Potassium    4.2  (3.5-5.1)  mmol/L


 


Chloride    111 H D  ()  mmol/L


 


Carbon Dioxide    13 L*  (22-30)  mmol/L


 


Anion Gap    18.3 H  (5-15)  MEQ/L


 


BUN    91 H  (7-17)  mg/dL


 


Creatinine    8.34 H  (0.52-1.04)  mg/dL


 


Estimated GFR    5.0  ML/MIN


 


Glucose    107 H  ()  mg/dL


 


POC Glucometer   122 H   (74 to 106)  mg/dL


 


Hemoglobin A1c  6.46 H    (4.5-6.0)  %


 


Lactic Acid     (0.4-2.0)  


 


Calcium    8.3 L D  (8.4-10.2)  mg/dL


 


Total Bilirubin    0.30  (0.2-1.3)  mg/dL


 


AST    20  (14-36)  U/L


 


ALT    20  (0-35)  U/L


 


Alkaline Phosphatase    258 H  ()  U/L


 


Troponin I     (0.000-0.034)  ng/mL


 


Serum Total Protein    5.6 L  (6.3-8.2)  g/dL


 


Albumin    3.1 L  (3.5-5.0)  g/dL


 


Amylase     ()  U/L


 


Lipase     ()  U/L


 


Urine Color     (YELLOW)  


 


Urine Appearance     (CLEAR)  


 


Urine pH     (5-6)  


 


Ur Specific Gravity     (1.005-1.025)  


 


Urine Protein     (Negative)  


 


Urine Ketones     (NEGATIVE)  


 


Urine Blood     (0-5)  Adonay/ul


 


Urine Nitrite     (NEGATIVE)  


 


Urine Bilirubin     (NEGATIVE)  


 


Urine Urobilinogen     (0-1)  mg/dL


 


Ur Leukocyte Esterase     (NEGATIVE)  


 


Urine WBC (Auto)     (0-5)  /HPF


 


Urine RBC (Auto)     (0-2)  /HPF


 


U Hyaline Cast (Auto)     (0-2)  /LPF


 


U Epithel Cells (Auto)     (FEW)  /HPF


 


Urine Bacteria (Auto)     (NEGATIVE)  /HPF


 


Urine Mucus (Auto)     (NEGATIVE)  /HPF


 


Urine Culture Reflexed     (NO)  


 


Urine Glucose     (NEGATIVE)  mg/dL














  10/03/20 10/04/20 10/04/20 Range/Units





  20:35 04:00 05:45 


 


WBC     (4.0-10.5)  K/mm3


 


RBC     (4.1-5.4)  M/mm3


 


Hgb     (12.0-16.0)  gm/dl


 


Hct     (35-47)  %


 


MCV     ()  fl


 


MCH     (26-32)  pg


 


MCHC     (32-36)  g/dl


 


RDW     (11.5-14.0)  %


 


Plt Count     (150-450)  K/mm3


 


MPV     (7.5-11.0)  fl


 


Gran %     (36.0-66.0)  %


 


Eos # (Auto)     (0-0.5)  


 


Absolute Lymphs (auto)     (1.0-4.6)  


 


Absolute Monos (auto)     (0.0-1.3)  


 


Lymphocytes %     (24.0-44.0)  %


 


Monocytes %     (0.0-12.0)  %


 


Eosinophils %     (0.00-5.0)  %


 


Basophils %     (0.0-0.4)  %


 


Absolute Granulocytes     (1.4-6.9)  


 


Basophils #     (0-0.4)  


 


Sodium    141  (137-145)  mmol/L


 


Potassium    4.0  (3.5-5.1)  mmol/L


 


Chloride    119 H  ()  mmol/L


 


Carbon Dioxide    11 L*  (22-30)  mmol/L


 


Anion Gap    14.6  (5-15)  MEQ/L


 


BUN    78 H  (7-17)  mg/dL


 


Creatinine    6.92 H  (0.52-1.04)  mg/dL


 


Estimated GFR    6.1  ML/MIN


 


Glucose    103  ()  mg/dL


 


POC Glucometer  95    (74 to 106)  mg/dL


 


Hemoglobin A1c     (4.5-6.0)  %


 


Lactic Acid   0.7   (0.4-2.0)  


 


Calcium    8.2 L  (8.4-10.2)  mg/dL


 


Total Bilirubin    0.30  (0.2-1.3)  mg/dL


 


AST    19  (14-36)  U/L


 


ALT    17  (0-35)  U/L


 


Alkaline Phosphatase    236 H  ()  U/L


 


Troponin I     (0.000-0.034)  ng/mL


 


Serum Total Protein    5.2 L  (6.3-8.2)  g/dL


 


Albumin    2.7 L  (3.5-5.0)  g/dL


 


Amylase    169 H  ()  U/L


 


Lipase    1394 H  ()  U/L


 


Urine Color     (YELLOW)  


 


Urine Appearance     (CLEAR)  


 


Urine pH     (5-6)  


 


Ur Specific Gravity     (1.005-1.025)  


 


Urine Protein     (Negative)  


 


Urine Ketones     (NEGATIVE)  


 


Urine Blood     (0-5)  Adonay/ul


 


Urine Nitrite     (NEGATIVE)  


 


Urine Bilirubin     (NEGATIVE)  


 


Urine Urobilinogen     (0-1)  mg/dL


 


Ur Leukocyte Esterase     (NEGATIVE)  


 


Urine WBC (Auto)     (0-5)  /HPF


 


Urine RBC (Auto)     (0-2)  /HPF


 


U Hyaline Cast (Auto)     (0-2)  /LPF


 


U Epithel Cells (Auto)     (FEW)  /HPF


 


Urine Bacteria (Auto)     (NEGATIVE)  /HPF


 


Urine Mucus (Auto)     (NEGATIVE)  /HPF


 


Urine Culture Reflexed     (NO)  


 


Urine Glucose     (NEGATIVE)  mg/dL














  10/04/20 10/04/20 Range/Units





  06:00 06:31 


 


WBC  6.9   (4.0-10.5)  K/mm3


 


RBC  2.68 L   (4.1-5.4)  M/mm3


 


Hgb  8.1 L D   (12.0-16.0)  gm/dl


 


Hct  26.8 L   (35-47)  %


 


MCV  100.0   ()  fl


 


MCH  30.2   (26-32)  pg


 


MCHC  30.2 L   (32-36)  g/dl


 


RDW  15.4 H   (11.5-14.0)  %


 


Plt Count  183  D   (150-450)  K/mm3


 


MPV  11.5 H   (7.5-11.0)  fl


 


Gran %  57.7   (36.0-66.0)  %


 


Eos # (Auto)  0.23   (0-0.5)  


 


Absolute Lymphs (auto)  2.16   (1.0-4.6)  


 


Absolute Monos (auto)  0.51   (0.0-1.3)  


 


Lymphocytes %  31.2   (24.0-44.0)  %


 


Monocytes %  7.4   (0.0-12.0)  %


 


Eosinophils %  3.3   (0.00-5.0)  %


 


Basophils %  0.4   (0.0-0.4)  %


 


Absolute Granulocytes  3.99   (1.4-6.9)  


 


Basophils #  0.03   (0-0.4)  


 


Sodium    (137-145)  mmol/L


 


Potassium    (3.5-5.1)  mmol/L


 


Chloride    ()  mmol/L


 


Carbon Dioxide    (22-30)  mmol/L


 


Anion Gap    (5-15)  MEQ/L


 


BUN    (7-17)  mg/dL


 


Creatinine    (0.52-1.04)  mg/dL


 


Estimated GFR    ML/MIN


 


Glucose    ()  mg/dL


 


POC Glucometer   98  (74 to 106)  mg/dL


 


Hemoglobin A1c    (4.5-6.0)  %


 


Lactic Acid    (0.4-2.0)  


 


Calcium    (8.4-10.2)  mg/dL


 


Total Bilirubin    (0.2-1.3)  mg/dL


 


AST    (14-36)  U/L


 


ALT    (0-35)  U/L


 


Alkaline Phosphatase    ()  U/L


 


Troponin I    (0.000-0.034)  ng/mL


 


Serum Total Protein    (6.3-8.2)  g/dL


 


Albumin    (3.5-5.0)  g/dL


 


Amylase    ()  U/L


 


Lipase    ()  U/L


 


Urine Color    (YELLOW)  


 


Urine Appearance    (CLEAR)  


 


Urine pH    (5-6)  


 


Ur Specific Gravity    (1.005-1.025)  


 


Urine Protein    (Negative)  


 


Urine Ketones    (NEGATIVE)  


 


Urine Blood    (0-5)  Adonay/ul


 


Urine Nitrite    (NEGATIVE)  


 


Urine Bilirubin    (NEGATIVE)  


 


Urine Urobilinogen    (0-1)  mg/dL


 


Ur Leukocyte Esterase    (NEGATIVE)  


 


Urine WBC (Auto)    (0-5)  /HPF


 


Urine RBC (Auto)    (0-2)  /HPF


 


U Hyaline Cast (Auto)    (0-2)  /LPF


 


U Epithel Cells (Auto)    (FEW)  /HPF


 


Urine Bacteria (Auto)    (NEGATIVE)  /HPF


 


Urine Mucus (Auto)    (NEGATIVE)  /HPF


 


Urine Culture Reflexed    (NO)  


 


Urine Glucose    (NEGATIVE)  mg/dL











Radiology Exams: 


                              Radiology Procedures











 Category Date Time Status


 


 ABDOMEN AND PELVIS W/0 CONTRAS [CT] Routine Exams  10/04/20 07:22 Completed











CT/ABDOMEN AND PELVIS W/0 CONTRAS


Indication: Nausea, vomiting, weakness, and elevated amylase/lipase.





Multiple contiguous axial images obtained through the abdomen and pelvis


without contrast as ordered.





Comparison: None





Lung bases demonstrates fibrosis/scarring and minimal bilateral dependent


atelectasis.  No infiltrate or effusion.  Heart is borderline enlarged.  Small


hiatal hernia.





Noncontrasted stomach and bowel loops appear nonobstructed.  Normal appendix.


Scattered sigmoid diverticulosis.  Head and body of the pancreas appears


prominent/edematous with peripancreatic stranding favoring acute pancreatitis.


 Adjacent duodenal circumferential wall thickening presumed reactive.  Tiny


inferior perihepatic and pelvic free fluid.  No walled off fluid collection or


free air.  Previous cholecystectomy and hysterectomy.





Remaining liver, spleen, adrenal glands, kidneys, ureters, and bladder appear


unremarkable for noncontrast exam.  Mild aortoiliac calcifications without AAA.





Osseous structures demonstrates mild osteopenia, mild/moderate degenerative


spondylosis throughout the thoracolumbar spine, and left L5 spondylolysis with


2-3 mm spondylolisthesis.





Impression:


1.  CT findings favoring acute pancreatitis as detailed with tiny free fluid.


No walled off fluid collection or free air.


2.  Incidental small hiatal hernia, sigmoid diverticulosis, and chronic bony


findings.





Assessment/Plan


(1) Pancreatitis


Current Visit: Yes   Status: Acute   


Qualifiers: 


   Chronicity: acute   Pancreatitis type: unspecified pancreatitis type   Acute 

pancreatitis complication: unspecified   Qualified Code(s): K85.90 - Acute 

pancreatitis without necrosis or infection, unspecified   


Assessment & Plan: 


                                 Chief Complaint





Diagnosis                        DEHYDRATION, ARF





                                    Allergies











Allergy/AdvReac Type Severity Reaction Status Date / Time


 


No Known Drug Allergies Allergy   Verified 10/03/20 10:37








                           Vital Signs (Last 24 hours)











  Temp Pulse Resp BP Pulse Ox


 


 10/04/20 07:01  98.7 F  67  18  91/44  94 L


 


 10/04/20 06:59      94 L


 


 10/04/20 04:00  97.5 F  79  17  83/43  97


 


 10/03/20 23:47  98.0 F  79  16  89/43  96


 


 10/03/20 21:30   71   87/40 


 


 10/03/20 20:11      94 L


 


 10/03/20 20:00  98.2 F  71  17  89/46  98


 


 10/03/20 16:00  98.1 F  79  18  88/47  93 L


 


 10/03/20 13:31      94 L


 


 10/03/20 12:20  98.5 F  69  16  91/44  98


 


 10/03/20 12:01   68  16  84/39  94 L


 


 10/03/20 11:56      99


 


 10/03/20 11:05   72  18  83/40  99


 


 10/03/20 10:22  98.3 F  78   88/45  100








                                Home Medications











 Medication  Instructions  Recorded  Confirmed  Last Taken  Type


 


Empagliflozin [Jardiance] 10 mg PO QAM 10/03/20 10/03/20 10/02/20 History


 


Lisinopril/Hydrochlorothiazide 1 tab PO DAILY 10/03/20 10/03/20 10/02/20 History





[Lisinopril-Hctz 10-12.5 mg Tab]     


 


Ondansetron HCl 4 mg SL Q8H 10/03/20 10/03/20 10/02/20 History


 


Simvastatin 40 mg PO HS 10/03/20 10/03/20 10/02/20 History


 


allopurinoL [Allopurinol] 300 mg PO BID 10/03/20 10/03/20 10/02/20 History








                               Current Medications











Generic Name Dose Route Start Last Admin





  Trade Name Freq  PRN Reason Stop Dose Admin


 


Acetaminophen  650 mg  10/03/20 16:22  10/04/20 06:39





  Tylenol 325 Mg***  PO  11/02/20 16:21  650 mg





  Q4H PRN PRN   Administration





  PAIN AND/OR FEVER  


 


Aspirin  81 mg  10/03/20 14:00  10/03/20 14:35





  Ecotrin 81 Mg***  PO  11/02/20 13:59  Not Given





  DAILY BETZAIDA  


 


Sodium Chloride  1,000 mls @ 150 mls/hr  10/03/20 12:25  10/04/20 08:05





  Sodium Chloride 0.9% 1000 Ml  IV  11/02/20 12:24  150 mls/hr





  .Q6H40M BETZAIDA   Administration


 


Ceftriaxone Sodium/Dextrose  1 g in 50 mls @ 100 mls/hr  10/04/20 10:00 





  Rocephin 1 Gm-D5w 50 Ml Bag**  IV  11/03/20 09:59 





  Q24H10 BETZAIDA  


 


Insulin Human Regular  0 unit  10/03/20 12:25 





  Humulin R  SQ  11/02/20 12:24 





  UD PRN  





  HYPERGLYCEMIA  


 


Metoprolol Succinate  50 mg  10/03/20 14:00  10/03/20 21:01





  Toprol Xl 50 Mg***  PO  11/02/20 13:59  Not Given





  BID BETZAIDA  


 


Ondansetron HCl  4 mg  10/03/20 12:25  10/03/20 23:08





  Zofran 4 Mg/2 Ml Vial**  IV  11/02/20 12:24  4 mg





  Q6H PRN PRN   Administration





  NAUSEA/VOMITING  














Discontinued Medications














Generic Name Dose Route Start Last Admin





  Trade Name Freq  PRN Reason Stop Dose Admin


 


Sodium Chloride  1,000 mls @ 999 mls/hr  10/03/20 10:20  10/03/20 11:50





  Sodium Chloride 0.9% 1000 Ml  IV  10/03/20 11:20  Infused





  .Q1H1M STA   Infusion


 


Sodium Chloride  Confirm  10/03/20 10:46 





  Sodium Chloride 0.9% 1000 Ml  Administered  10/03/20 10:47 





  Dose  





  1,000 mls @ ud  





  .ROUTE  





  .STK-MED ONE  


 


Sodium Chloride  Confirm  10/03/20 11:32 





  Sodium Chloride 0.9% 1000 Ml  Administered  10/03/20 11:33 





  Dose  





  1,000 mls @ ud  





  .ROUTE  





  .STK-MED ONE  


 


Sodium Chloride  1,000 mls @ 999 mls/hr  10/03/20 11:51  10/03/20 11:52





  Sodium Chloride 0.9% 1000 Ml  IV  10/03/20 12:51  999 mls/hr





  .Q1H1M STA   Administration


 


Sodium Chloride  1,000 mls @ 999 mls/hr  10/03/20 16:16  10/03/20 16:44





  Sodium Chloride 0.9% 1000 Ml  IV  10/03/20 17:16  999 mls/hr





  .Q1H1M STA   Administration


 


Sodium Chloride  1,000 mls @ 999 mls/hr  10/03/20 17:30  10/03/20 17:38





  Sodium Chloride 0.9% 1000 Ml  IV  10/03/20 18:00  999 mls/hr





  .Q1H1M BETZAIDA   Administration


 


Sodium Chloride  1,000 mls @ 999 mls/hr  10/03/20 21:56  10/04/20 00:08





  Sodium Chloride 0.9% 1000 Ml  IV  10/03/20 22:56  999 mls/hr





  .Q1H1M STA   Administration


 


Ondansetron HCl  4 mg  10/03/20 10:20  10/03/20 10:49





  Zofran 4 Mg/2 Ml Vial**  IV  10/03/20 10:21  4 mg





  STAT ONE   Administration


 


Ondansetron HCl  Confirm  10/03/20 10:46 





  Zofran 4 Mg/2 Ml Vial**  Administered  10/03/20 10:47 





  Dose  





  4 mg  





  .ROUTE  





  .STK-MED ONE  


 


Pantoprazole Sodium  40 mg  10/03/20 10:20  10/03/20 10:49





  Protonix 40 Mg Iv***  IV  10/03/20 10:21  40 mg





  STAT ONE   Administration


 


Pantoprazole Sodium  Confirm  10/03/20 10:46 





  Protonix 40 Mg Iv***  Administered  10/03/20 10:47 





  Dose  





  40 mg  





  IV  





  .STK-MED ONE  








                         Intake & Output (Last 24 hours)











 10/01/20 10/02/20 10/03/20 10/04/20





 11:59 11:59 11:59 11:59


 


Intake Total    9956


 


Output Total    1350


 


Balance    8606


 


Weight   80.286 kg 80.3 kg








                      Microbiology Results (Last 24 hours)





10/04/20 06:30   Blood   Blood Culture Gram Stain - Pending


10/04/20 06:30   Blood   Blood Culture - Pending


10/03/20 11:02   Urine, Void   Urine Culture - Pending





                       Laboratory Results (Last 24 hours)











  10/04/20 10/04/20 10/04/20





  06:31 06:00 05:45


 


WBC   6.9 


 


RBC   2.68 L 


 


Hgb   8.1 L D 


 


Hct   26.8 L 


 


MCV   100.0 


 


MCH   30.2 


 


MCHC   30.2 L 


 


RDW   15.4 H 


 


Plt Count   183  D 


 


MPV   11.5 H 


 


Gran %   57.7 


 


Eos # (Auto)   0.23 


 


Absolute Lymphs (auto)   2.16 


 


Absolute Monos (auto)   0.51 


 


Lymphocytes %   31.2 


 


Monocytes %   7.4 


 


Eosinophils %   3.3 


 


Basophils %   0.4 


 


Absolute Granulocytes   3.99 


 


Basophils #   0.03 


 


Sodium    141


 


Potassium    4.0


 


Chloride    119 H


 


Carbon Dioxide    11 L*


 


Anion Gap    14.6


 


BUN    78 H


 


Creatinine    6.92 H


 


Estimated GFR    6.1


 


Glucose    103


 


POC Glucometer  98  


 


Hemoglobin A1c   


 


Lactic Acid   


 


Calcium    8.2 L


 


Total Bilirubin    0.30


 


AST    19


 


ALT    17


 


Alkaline Phosphatase    236 H


 


Troponin I   


 


Serum Total Protein    5.2 L


 


Albumin    2.7 L


 


Amylase    169 H


 


Lipase    1394 H


 


Urine Color   


 


Urine Appearance   


 


Urine pH   


 


Ur Specific Gravity   


 


Urine Protein   


 


Urine Ketones   


 


Urine Blood   


 


Urine Nitrite   


 


Urine Bilirubin   


 


Urine Urobilinogen   


 


Ur Leukocyte Esterase   


 


Urine WBC (Auto)   


 


Urine RBC (Auto)   


 


U Hyaline Cast (Auto)   


 


U Epithel Cells (Auto)   


 


Urine Bacteria (Auto)   


 


Urine Mucus (Auto)   


 


Urine Culture Reflexed   


 


Urine Glucose   














  10/04/20 10/03/20 10/03/20





  04:00 20:35 19:25


 


WBC   


 


RBC   


 


Hgb   


 


Hct   


 


MCV   


 


MCH   


 


MCHC   


 


RDW   


 


Plt Count   


 


MPV   


 


Gran %   


 


Eos # (Auto)   


 


Absolute Lymphs (auto)   


 


Absolute Monos (auto)   


 


Lymphocytes %   


 


Monocytes %   


 


Eosinophils %   


 


Basophils %   


 


Absolute Granulocytes   


 


Basophils #   


 


Sodium    138


 


Potassium    4.2


 


Chloride    111 H D


 


Carbon Dioxide    13 L*


 


Anion Gap    18.3 H


 


BUN    91 H


 


Creatinine    8.34 H


 


Estimated GFR    5.0


 


Glucose    107 H


 


POC Glucometer   95 


 


Hemoglobin A1c   


 


Lactic Acid  0.7  


 


Calcium    8.3 L D


 


Total Bilirubin    0.30


 


AST    20


 


ALT    20


 


Alkaline Phosphatase    258 H


 


Troponin I   


 


Serum Total Protein    5.6 L


 


Albumin    3.1 L


 


Amylase   


 


Lipase   


 


Urine Color   


 


Urine Appearance   


 


Urine pH   


 


Ur Specific Gravity   


 


Urine Protein   


 


Urine Ketones   


 


Urine Blood   


 


Urine Nitrite   


 


Urine Bilirubin   


 


Urine Urobilinogen   


 


Ur Leukocyte Esterase   


 


Urine WBC (Auto)   


 


Urine RBC (Auto)   


 


U Hyaline Cast (Auto)   


 


U Epithel Cells (Auto)   


 


Urine Bacteria (Auto)   


 


Urine Mucus (Auto)   


 


Urine Culture Reflexed   


 


Urine Glucose   














  10/03/20 10/03/20 10/03/20





  16:08 14:30 14:00


 


WBC   


 


RBC   


 


Hgb   


 


Hct   


 


MCV   


 


MCH   


 


MCHC   


 


RDW   


 


Plt Count   


 


MPV   


 


Gran %   


 


Eos # (Auto)   


 


Absolute Lymphs (auto)   


 


Absolute Monos (auto)   


 


Lymphocytes %   


 


Monocytes %   


 


Eosinophils %   


 


Basophils %   


 


Absolute Granulocytes   


 


Basophils #   


 


Sodium   


 


Potassium   


 


Chloride   


 


Carbon Dioxide   


 


Anion Gap   


 


BUN   


 


Creatinine   


 


Estimated GFR   


 


Glucose   


 


POC Glucometer  122 H  


 


Hemoglobin A1c   6.46 H 


 


Lactic Acid   


 


Calcium   


 


Total Bilirubin   


 


AST   


 


ALT   


 


Alkaline Phosphatase   


 


Troponin I    0.012


 


Serum Total Protein   


 


Albumin   


 


Amylase   


 


Lipase   


 


Urine Color   


 


Urine Appearance   


 


Urine pH   


 


Ur Specific Gravity   


 


Urine Protein   


 


Urine Ketones   


 


Urine Blood   


 


Urine Nitrite   


 


Urine Bilirubin   


 


Urine Urobilinogen   


 


Ur Leukocyte Esterase   


 


Urine WBC (Auto)   


 


Urine RBC (Auto)   


 


U Hyaline Cast (Auto)   


 


U Epithel Cells (Auto)   


 


Urine Bacteria (Auto)   


 


Urine Mucus (Auto)   


 


Urine Culture Reflexed   


 


Urine Glucose   














  10/03/20 10/03/20 10/03/20





  11:03 11:02 11:00


 


WBC   


 


RBC   


 


Hgb   


 


Hct   


 


MCV   


 


MCH   


 


MCHC   


 


RDW   


 


Plt Count   


 


MPV   


 


Gran %   


 


Eos # (Auto)   


 


Absolute Lymphs (auto)   


 


Absolute Monos (auto)   


 


Lymphocytes %   


 


Monocytes %   


 


Eosinophils %   


 


Basophils %   


 


Absolute Granulocytes   


 


Basophils #   


 


Sodium   


 


Potassium   


 


Chloride   


 


Carbon Dioxide   


 


Anion Gap   


 


BUN   


 


Creatinine   


 


Estimated GFR   


 


Glucose   


 


POC Glucometer   


 


Hemoglobin A1c   


 


Lactic Acid  1.6  


 


Calcium   


 


Total Bilirubin   


 


AST   


 


ALT   


 


Alkaline Phosphatase   


 


Troponin I    0.016


 


Serum Total Protein   


 


Albumin   


 


Amylase   


 


Lipase   


 


Urine Color   YELLOW 


 


Urine Appearance   SLIGHTLY CLOUDY 


 


Urine pH   5.0 


 


Ur Specific Gravity   1.016 


 


Urine Protein   100 


 


Urine Ketones   TRACE 


 


Urine Blood   MODERATE 


 


Urine Nitrite   NEGATIVE 


 


Urine Bilirubin   SMALL 


 


Urine Urobilinogen   2 


 


Ur Leukocyte Esterase   SMALL 


 


Urine WBC (Auto)   6-10 


 


Urine RBC (Auto)   0-2 


 


U Hyaline Cast (Auto)   6-10 


 


U Epithel Cells (Auto)   RARE 


 


Urine Bacteria (Auto)   FEW 


 


Urine Mucus (Auto)   SLIGHT 


 


Urine Culture Reflexed   YES 


 


Urine Glucose   50 














  10/03/20 10/03/20





  11:00 11:00


 


WBC   9.9


 


RBC   3.39 L


 


Hgb   10.3 L


 


Hct   33.3 L


 


MCV   98.2


 


MCH   30.4


 


MCHC   30.9 L


 


RDW   15.3 H


 


Plt Count   295


 


MPV   11.1 H


 


Gran %   78.4 H


 


Eos # (Auto)   0.10


 


Absolute Lymphs (auto)   1.63


 


Absolute Monos (auto)   0.39


 


Lymphocytes %   16.4 L


 


Monocytes %   3.9


 


Eosinophils %   1.0


 


Basophils %   0.3


 


Absolute Granulocytes   7.78 H


 


Basophils #   0.03


 


Sodium  135 L 


 


Potassium  4.5 


 


Chloride  99 


 


Carbon Dioxide  14 L* 


 


Anion Gap  26.1 H 


 


BUN  101 H 


 


Creatinine  10.32 H 


 


Estimated GFR  3.9 


 


Glucose  173 H 


 


POC Glucometer  


 


Hemoglobin A1c  


 


Lactic Acid  


 


Calcium  10.1 


 


Total Bilirubin  0.40 


 


AST  26 


 


ALT  27 


 


Alkaline Phosphatase  372 H 


 


Troponin I  


 


Serum Total Protein  7.4 


 


Albumin  4.3 


 


Amylase  


 


Lipase  1055 H 


 


Urine Color  


 


Urine Appearance  


 


Urine pH  


 


Ur Specific Gravity  


 


Urine Protein  


 


Urine Ketones  


 


Urine Blood  


 


Urine Nitrite  


 


Urine Bilirubin  


 


Urine Urobilinogen  


 


Ur Leukocyte Esterase  


 


Urine WBC (Auto)  


 


Urine RBC (Auto)  


 


U Hyaline Cast (Auto)  


 


U Epithel Cells (Auto)  


 


Urine Bacteria (Auto)  


 


Urine Mucus (Auto)  


 


Urine Culture Reflexed  


 


Urine Glucose  








                             Orders (Last 24 hours)











 Category Date Time Status


 


 Up Ad Freda ROUTINE Activity  10/03/20 12:25 Active


 


 Admit as Inpatient ROUTINE Care  10/03/20 12:25 Active


 


 Code Status Order ROUTINE Care  10/03/20 12:25 Active


 


 IV Care Q6H Care  10/03/20 12:25 Active


 


 IV Insertion STAT Care  10/03/20 11:04 Completed


 


 IV Insertion-2nd Peripheral STAT Care  10/03/20 11:04 Completed


 


 Miscellaneous Nursing Order ROUTINE Care  10/03/20 12:25 Completed


 


 POCT Glucose Check ACHS Care  10/03/20 12:25 Active


 


 Consistent Carbohydrate Diet 1800 Calorie Diet  10/03/20 Dinner Active


 


 ABDOMEN AND PELVIS W/0 CONTRAS [CT] Routine Exams  10/04/20 07:22 Completed


 


 AMYLASE Routine Lab  10/04/20 05:45 Completed


 


 BLOOD CULTURE Urgent Lab  10/04/20 Received


 


 CBC W DIFF AM.LAB Lab  10/05/20 04:00 Ordered


 


 CBC W DIFF Routine Lab  10/04/20 06:00 Completed


 


 CBC W DIFF Stat Lab  10/03/20 11:00 Completed


 


 CMP AM.LAB Lab  10/04/20 05:45 Completed


 


 CMP AM.LAB Lab  10/05/20 04:00 Ordered


 


 CMP Stat Lab  10/03/20 11:00 Completed


 


 CMP Urgent Lab  10/03/20 19:25 Completed


 


 CULTURE,URINE Stat Lab  10/03/20 11:02 Received


 


 HEMOGLOBIN A1C Urgent Lab  10/03/20 14:30 Completed


 


 LIPASE Routine Lab  10/04/20 05:45 Completed


 


 LIPASE Stat Lab  10/03/20 11:00 Completed


 


 Lactic Acid AM.LAB Lab  10/04/20 04:00 Completed


 


 Lactic Acid Stat Lab  10/03/20 11:03 Completed


 


 POCT GLUCOSE Stat Lab  10/03/20 16:08 Completed


 


 POCT GLUCOSE Stat Lab  10/03/20 20:35 Completed


 


 POCT GLUCOSE Stat Lab  10/04/20 06:31 Completed


 


 TROPONIN Q3H Lab  10/03/20 11:00 Completed


 


 TROPONIN Q3H Lab  10/03/20 14:00 Completed


 


 UA W/RFX UR CULTURE Stat Lab  10/03/20 11:02 Completed


 


 Acetaminophen 325 mg*** [Tylenol 325 mg***] Med  10/03/20 16:22 Active





 650 mg PO Q4H PRN PRN   


 


 Aspirin EC 81 mg*** [Ecotrin 81 mg***] Med  10/03/20 14:00 Active





 81 mg PO DAILY   


 


 Ceftriaxone 1 GM/50 ML PREMIX* [ROCEPHIN 1 Gm-D5w 50 ml Med  10/04/20 10:00 

Active





 Bag**]   





 1 g in 50 ml IV Q24H10   


 


 Flu Vacc [id number](65Yr Up)/Pf [Fluzone High-Dose Quad Med  10/05/20 10:00 Once





 2020-21]   





 240 mcg IM .ONCE ONE   


 


 Insulin Regular, Human [Humulin R] Med  10/03/20 12:25 Active





 See Dose Instructions  SQ UD PRN   


 


 Metoprolol Succinate 50 mg*** [Toprol Xl 50 MG***] Med  10/03/20 14:00 Active





 50 mg PO BID   


 


 NaCl 0.9% 1000 ml [Sodium Chloride 0.9% 1000 ML] 1,000 Med  10/03/20 10:46 

Discontinued





 ml   





 .ROUTE UD   


 


 NaCl 0.9% 1000 ml [Sodium Chloride 0.9% 1000 ML] 1,000 Med  10/03/20 11:32 

Discontinued





 ml   





 .ROUTE UD   


 


 NaCl 0.9% 1000 ml [Sodium Chloride 0.9% 1000 ML] 1,000 Med  10/03/20 12:25 

Active





 ml   





  mls/hr   


 


 NaCl 0.9% 1000 ml [Sodium Chloride 0.9% 1000 ML] 1,000 Med  10/03/20 10:20 

Discontinued





 ml   





  mls/hr   


 


 NaCl 0.9% 1000 ml [Sodium Chloride 0.9% 1000 ML] 1,000 Med  10/03/20 11:51 

Discontinued





 ml   





  mls/hr   


 


 NaCl 0.9% 1000 ml [Sodium Chloride 0.9% 1000 ML] 1,000 Med  10/03/20 16:16 

Discontinued





 ml   





  mls/hr   


 


 NaCl 0.9% 1000 ml [Sodium Chloride 0.9% 1000 ML] 1,000 Med  10/03/20 17:30 

Discontinued





 ml   





  mls/hr   


 


 NaCl 0.9% 1000 ml [Sodium Chloride 0.9% 1000 ML] 1,000 Med  10/03/20 21:56 

Discontinued





 ml   





  mls/hr   


 


 Ondansetron HCl 4 mg/2 ml** [Zofran 4 MG/2 ML VIAL**] Med  10/03/20 10:46 

Discontinued





 4 mg .ROUTE .STK-MED ONE   


 


 Ondansetron HCl 4 mg/2 ml** [Zofran 4 MG/2 ML VIAL**] Med  10/03/20 12:25 

Active





 4 mg IV Q6H PRN PRN   


 


 Ondansetron HCl 4 mg/2 ml** [Zofran 4 MG/2 ML VIAL**] Med  10/03/20 10:20 

Discontinued





 4 mg IV STAT ONE   


 


 Pantoprazole 40 mg*** [Protonix 40 mg IV***] Med  10/03/20 10:46 Discontinued





 40 mg IV .STK-MED ONE   


 


 Pantoprazole 40 mg*** [Protonix 40 mg IV***] Med  10/03/20 10:20 Discontinued





 40 mg IV STAT ONE   


 


 Oxygen Nasal Cannula 2 lpm RT  10/03/20 12:25 Active


 


 Pulse Oximetry .spot check RT  10/03/20 20:11 Active








                       Patient Care Notes (Last 24 hours)





10/04/20 07:29 Nursing Note by Mulu Vuong


order given by dr. romano to change blood cultures to just 1 since pt is a hard

stick. 





Initialized on 10/04/20 07:29 - END OF NOTE








10/04/20 07:15 (created 10/04/20 07:30) Nursing Note by Mulu Vuong dr. rounded on pt. orders given to get ct scan without contrast, blood 

cultures, rocephin 1gm daily and continue ivf at 150/hr. 





Initialized on 10/04/20 07:30 - END OF NOTE








10/04/20 04:22 CNA Note by Deana White RN aware of pts BP being 83/43





Initialized on 10/04/20 04:22 - END OF NOTE








10/04/20 04:08 Nursing Note by Radha Galeana


This nurse called Dr. Romano and reported pt BP 83/43, appx 5L fluids in and 

700mL urine out on this shift. Patient now experiencing slight SOB with 

exertion, crackles present in posterior bilateral lower lobes. Mild nonpitting 

edema present to BUE. Patient has intermittent dizziness/lightheadedness with 

ambulation. No new orders at this time. Will continue to monitor patient.





Initialized on 10/04/20 04:08 - END OF NOTE








10/03/20 22:00 Nursing Note by Radha Galeana


This nurse called Dr. Romano and notified him of critical CO2 level of 13, 

continued hypotension BP 87/40 following 2L NS bolus, fluid intake of 4800mL and

only 300mL urine out. Order received for 3L NS 0.9% NACL bolus. New orders 

explained to patient. She verbalizes understanding. Also explained symptoms to 

report such as dyspnea and pain at IV site. Explained need to closely monitor 

urine output.





Initialized on 10/03/20 22:00 - END OF NOTE








10/03/20 18:34 Nursing Note by Mulu Vuong


assisted to bathroom x1 assist, voided 300cc clear yellow urine.





Initialized on 10/03/20 18:34 - END OF NOTE








10/03/20 16:20 (created 10/03/20 17:24) Nursing Note by Mulu Vuong dr. updated on bp, new orders for 2L NS.





Initialized on 10/03/20 17:24 - END OF NOTE














Code(s): K85.90 - ACUTE PANCREATITIS WITHOUT NECROSIS OR INFECTION, UNSP   





(2) Acute renal failure syndrome


Current Visit: Yes   Status: Acute   


Qualifiers: 


   Acute renal failure type: unspecified   Qualified Code(s): N17.9 - Acute 

kidney failure, unspecified   





(3) Type 2 diabetes mellitus


Current Visit: Yes   Status: Acute   


Qualifiers: 


   Diabetes mellitus long term insulin use: without long term use   Diabetes 

mellitus complication status: with ketoacidosis   Diabetes mellitus complication

detail: without coma   Qualified Code(s): E11.10 - Type 2 diabetes mellitus with

ketoacidosis without coma   





(4) Metabolic acidosis due to diabetes mellitus


Current Visit: Yes   Status: Acute   Code(s): E11.69 - TYPE 2 DIABETES MELLITUS 

WITH OTHER SPECIFIED COMPLICATION; E87.2 - ACIDOSIS   





(5) Hypertension


Current Visit: Yes   Status: Chronic   


Qualifiers: 


   Hypertension type: essential hypertension   Qualified Code(s): I10 - 

Essential (primary) hypertension   


Code(s): I10 - ESSENTIAL (PRIMARY) HYPERTENSION   





(6) Dehydration


Current Visit: Yes   Status: Acute   Code(s): E86.0 - DEHYDRATION

## 2020-10-05 LAB
ALBUMIN SERPL-MCNC: 3 G/DL (ref 3.5–5)
ALP SERPL-CCNC: 232 U/L (ref 38–126)
ALT SERPL-CCNC: 19 U/L (ref 0–35)
AMYLASE SERPL-CCNC: 158 U/L (ref 30–110)
ANION GAP SERPL CALC-SCNC: 15 MEQ/L (ref 5–15)
AST SERPL QL: 26 U/L (ref 14–36)
BASOPHILS # BLD AUTO: 0.01 10*3/UL (ref 0–0.4)
BASOPHILS NFR BLD AUTO: 0.2 % (ref 0–0.4)
BILIRUB BLD-MCNC: 0.4 MG/DL (ref 0.2–1.3)
BUN SERPL-MCNC: 62 MG/DL (ref 7–17)
CALCIUM SPEC-MCNC: 8.8 MG/DL (ref 8.4–10.2)
CHLORIDE SERPL-SCNC: 119 MMOL/L (ref 98–107)
CO2 SERPL-SCNC: 11 MMOL/L (ref 22–30)
CREAT SERPL-MCNC: 4.79 MG/DL (ref 0.52–1.04)
EOSINOPHIL # BLD AUTO: 0.27 10*3/UL (ref 0–0.5)
GFR SERPLBLD BASED ON 1.73 SQ M-ARVRAT: 9.4 ML/MIN
GLUCOSE SERPL-MCNC: 111 MG/DL (ref 74–106)
HCT VFR BLD AUTO: 26 % (ref 35–47)
HGB BLD-MCNC: 7.9 GM/DL (ref 12–16)
LIPASE SERPL-CCNC: 930 U/L (ref 23–300)
LYMPHOCYTES # SPEC AUTO: 1.73 10*3/UL (ref 1–4.6)
MCH RBC QN AUTO: 30.5 PG (ref 26–32)
MCHC RBC AUTO-ENTMCNC: 30.4 G/DL (ref 32–36)
MONOCYTES # BLD AUTO: 0.29 10*3/UL (ref 0–1.3)
PLATELET # BLD AUTO: 173 K/MM3 (ref 150–450)
POTASSIUM SERPLBLD-SCNC: 3.9 MMOL/L (ref 3.5–5.1)
PROT SERPL-MCNC: 5.7 G/DL (ref 6.3–8.2)
RBC # BLD AUTO: 2.59 M/MM3 (ref 4.1–5.4)
SODIUM SERPL-SCNC: 142 MMOL/L (ref 137–145)
WBC # BLD AUTO: 5.8 K/MM3 (ref 4–10.5)

## 2020-10-05 RX ADMIN — METOPROLOL SUCCINATE SCH: 50 TABLET, EXTENDED RELEASE ORAL at 21:27

## 2020-10-05 RX ADMIN — ONDANSETRON PRN MG: 2 INJECTION, SOLUTION INTRAMUSCULAR; INTRAVENOUS at 08:53

## 2020-10-05 RX ADMIN — ASPIRIN SCH: 81 TABLET, COATED ORAL at 08:58

## 2020-10-05 RX ADMIN — PANTOPRAZOLE SODIUM SCH MG: 40 INJECTION, POWDER, FOR SOLUTION INTRAVENOUS at 08:52

## 2020-10-05 RX ADMIN — ONDANSETRON PRN MG: 2 INJECTION, SOLUTION INTRAMUSCULAR; INTRAVENOUS at 12:19

## 2020-10-05 RX ADMIN — METOPROLOL SUCCINATE SCH: 50 TABLET, EXTENDED RELEASE ORAL at 08:58

## 2020-10-05 RX ADMIN — CEFTRIAXONE SCH MLS/HR: 1 INJECTION, SOLUTION INTRAVENOUS at 08:57

## 2020-10-05 RX ADMIN — ONDANSETRON PRN MG: 2 INJECTION, SOLUTION INTRAMUSCULAR; INTRAVENOUS at 02:54

## 2020-10-05 RX ADMIN — PROMETHAZINE HYDROCHLORIDE PRN MG: 25 INJECTION INTRAMUSCULAR; INTRAVENOUS at 15:34

## 2020-10-05 NOTE — PCM.NOTE
Date and Time: 10/05/20  2031





Subjective Assessment: 





still c/o nausea, vomiting





- Review of Systems


Constitutional: No Fever, No Chills


Eyes: No Symptoms


Ears, Nose, & Throat: No Symptoms


Respiratory: No Cough, No Short Of Breath


Cardiac: No Chest Pain, No Edema, No Syncope


Abdominal/Gastrointestinal: Abdominal Pain, Nausea, Vomiting, No Diarrhea


Genitourinary Symptoms: No Dysuria


Musculoskeletal: No Back Pain, No Neck Pain


Skin: No Rash


Neurological: No Dizziness, No Focal Weakness, No Sensory Changes


Psychological: No Symptoms


Endocrine: No Symptoms


Hematologic/Lymphatic: No Symptoms


Immunological/Allergic: No Symptoms





Objective Exam


General Appearance: no apparent distress, alert


Neurologic Exam: alert, oriented x 3, cooperative, normal mood/affect, nml 

cerebellar function, sensation nml, No motor deficits


Skin Exam: normal color, warm, dry


Eye Exam: PERRL, EOMI, eyes nml inspection


Ears, Nose, Throat Exam: normal ENT inspection, pharynx normal, moist mucous 

membranes


Neck Exam: normal inspection, non-tender, supple, full range of motion


Respiratory Exam: normal breath sounds, lungs clear, No respiratory distress


Cardiovascular Exam: regular rate/rhythm, normal heart sounds


Gastrointestinal/Abdomen Exam: soft, No tenderness, No mass


Extremity Exam: normal inspection, normal range of motion


Back Exam: normal inspection, normal range of motion, No CVA tenderness, No 

vertebral tenderness


Pelvic Exam: deferred


Rectal Exam: deferred





OBJECTIVE DATA


Vital Signs: 


                               Vital Signs - 24 hr











  Temp Pulse Resp BP BP Pulse Ox


 


 10/05/20 19:10  98.3 F  74  18  123/54   94 L


 


 10/05/20 16:00  98.1 F  78  16   112/55  95


 


 10/05/20 12:00  98.3 F  62  20   95/46  98


 


 10/05/20 07:42  98.2 F  65  16   101/49  96


 


 10/05/20 03:45  97.8 F  70  16   88/44  96


 


 10/04/20 23:56  98.1 F  70  19   99/50  98








                        Pain Assessment - Last Documented











Pain Intensity                 0


 


Pain Scale Used                0-10 Pain Scale











Intake and Output: 


                                 Intake & Output











 10/03/20 10/04/20 10/05/20 10/06/20





 11:59 11:59 11:59 11:59


 


Intake Total  9915 3024 2120


 


Output Total  1350 3550 1900


 


Balance  8606 -526 220


 


Weight 80.286 kg 80.3 kg  80.3 kg











Lab Results: 


                            Lab Results-Last 24 Hours











  10/04/20 10/05/20 10/05/20 Range/Units





  20:29 04:47 04:47 


 


WBC   5.8   (4.0-10.5)  K/mm3


 


RBC   2.59 L   (4.1-5.4)  M/mm3


 


Hgb   7.9 L   (12.0-16.0)  gm/dl


 


Hct   26.0 L   (35-47)  %


 


MCV   100.4 H   ()  fl


 


MCH   30.5   (26-32)  pg


 


MCHC   30.4 L   (32-36)  g/dl


 


RDW   15.7 H   (11.5-14.0)  %


 


Plt Count   173   (150-450)  K/mm3


 


MPV   11.5 H   (7.5-11.0)  fl


 


Gran %   60.1   (36.0-66.0)  %


 


Eos # (Auto)   0.27   (0-0.5)  


 


Absolute Lymphs (auto)   1.73   (1.0-4.6)  


 


Absolute Monos (auto)   0.29   (0.0-1.3)  


 


Lymphocytes %   30.0   (24.0-44.0)  %


 


Monocytes %   5.0   (0.0-12.0)  %


 


Eosinophils %   4.7   (0.00-5.0)  %


 


Basophils %   0.2   (0.0-0.4)  %


 


Absolute Granulocytes   3.47   (1.4-6.9)  


 


Basophils #   0.01   (0-0.4)  


 


Sodium    142  (137-145)  mmol/L


 


Potassium    3.9  (3.5-5.1)  mmol/L


 


Chloride    119 H  ()  mmol/L


 


Carbon Dioxide    11 L*  (22-30)  mmol/L


 


Anion Gap    15.0  (5-15)  MEQ/L


 


BUN    62 H  (7-17)  mg/dL


 


Creatinine    4.79 H  (0.52-1.04)  mg/dL


 


Estimated GFR    9.4  ML/MIN


 


Glucose    111 H  ()  mg/dL


 


POC Glucometer  87    (74 to 106)  mg/dL


 


Calcium    8.8  (8.4-10.2)  mg/dL


 


Total Bilirubin    0.40  (0.2-1.3)  mg/dL


 


AST    26  (14-36)  U/L


 


ALT    19  (0-35)  U/L


 


Alkaline Phosphatase    232 H  ()  U/L


 


Serum Total Protein    5.7 L  (6.3-8.2)  g/dL


 


Albumin    3.0 L  (3.5-5.0)  g/dL


 


Amylase     ()  U/L


 


Lipase     ()  U/L














  10/05/20 10/05/20 10/05/20 Range/Units





  05:00 07:27 11:31 


 


WBC     (4.0-10.5)  K/mm3


 


RBC     (4.1-5.4)  M/mm3


 


Hgb     (12.0-16.0)  gm/dl


 


Hct     (35-47)  %


 


MCV     ()  fl


 


MCH     (26-32)  pg


 


MCHC     (32-36)  g/dl


 


RDW     (11.5-14.0)  %


 


Plt Count     (150-450)  K/mm3


 


MPV     (7.5-11.0)  fl


 


Gran %     (36.0-66.0)  %


 


Eos # (Auto)     (0-0.5)  


 


Absolute Lymphs (auto)     (1.0-4.6)  


 


Absolute Monos (auto)     (0.0-1.3)  


 


Lymphocytes %     (24.0-44.0)  %


 


Monocytes %     (0.0-12.0)  %


 


Eosinophils %     (0.00-5.0)  %


 


Basophils %     (0.0-0.4)  %


 


Absolute Granulocytes     (1.4-6.9)  


 


Basophils #     (0-0.4)  


 


Sodium     (137-145)  mmol/L


 


Potassium     (3.5-5.1)  mmol/L


 


Chloride     ()  mmol/L


 


Carbon Dioxide     (22-30)  mmol/L


 


Anion Gap     (5-15)  MEQ/L


 


BUN     (7-17)  mg/dL


 


Creatinine     (0.52-1.04)  mg/dL


 


Estimated GFR     ML/MIN


 


Glucose     ()  mg/dL


 


POC Glucometer   105  112 H  (74 to 106)  mg/dL


 


Calcium     (8.4-10.2)  mg/dL


 


Total Bilirubin     (0.2-1.3)  mg/dL


 


AST     (14-36)  U/L


 


ALT     (0-35)  U/L


 


Alkaline Phosphatase     ()  U/L


 


Serum Total Protein     (6.3-8.2)  g/dL


 


Albumin     (3.5-5.0)  g/dL


 


Amylase  158 H    ()  U/L


 


Lipase  930 H    ()  U/L














  10/05/20 Range/Units





  16:26 


 


WBC   (4.0-10.5)  K/mm3


 


RBC   (4.1-5.4)  M/mm3


 


Hgb   (12.0-16.0)  gm/dl


 


Hct   (35-47)  %


 


MCV   ()  fl


 


MCH   (26-32)  pg


 


MCHC   (32-36)  g/dl


 


RDW   (11.5-14.0)  %


 


Plt Count   (150-450)  K/mm3


 


MPV   (7.5-11.0)  fl


 


Gran %   (36.0-66.0)  %


 


Eos # (Auto)   (0-0.5)  


 


Absolute Lymphs (auto)   (1.0-4.6)  


 


Absolute Monos (auto)   (0.0-1.3)  


 


Lymphocytes %   (24.0-44.0)  %


 


Monocytes %   (0.0-12.0)  %


 


Eosinophils %   (0.00-5.0)  %


 


Basophils %   (0.0-0.4)  %


 


Absolute Granulocytes   (1.4-6.9)  


 


Basophils #   (0-0.4)  


 


Sodium   (137-145)  mmol/L


 


Potassium   (3.5-5.1)  mmol/L


 


Chloride   ()  mmol/L


 


Carbon Dioxide   (22-30)  mmol/L


 


Anion Gap   (5-15)  MEQ/L


 


BUN   (7-17)  mg/dL


 


Creatinine   (0.52-1.04)  mg/dL


 


Estimated GFR   ML/MIN


 


Glucose   ()  mg/dL


 


POC Glucometer  109 H  (74 to 106)  mg/dL


 


Calcium   (8.4-10.2)  mg/dL


 


Total Bilirubin   (0.2-1.3)  mg/dL


 


AST   (14-36)  U/L


 


ALT   (0-35)  U/L


 


Alkaline Phosphatase   ()  U/L


 


Serum Total Protein   (6.3-8.2)  g/dL


 


Albumin   (3.5-5.0)  g/dL


 


Amylase   ()  U/L


 


Lipase   ()  U/L











Radiology Exams: 


                              Radiology Procedures











 Category Date Time Status


 


 ABDOMEN AND PELVIS W/0 CONTRAS [CT] Routine Exams  10/04/20 07:22 Completed











Multi-Disciplinary Progress Notes: 


                        Multi-Disciplinary Progress Notes





10/05/20 17:02 Physical Therapy Note by Naya Majano.T. LIDIA HELD TODAY AS PT. WAS VERY NAUSEOUS.  HGB WAS LOW AS WELL.  NURSE 

REPORTED SHE HAD JUST GIVEN HER PHENERGAN AND THAT SHE WAS ASLEEP.  WILL ATTEMPT

 P.T. EVAL TOMORORW. 








NAYA MAJANO PT





Initialized on 10/05/20 17:02 - END OF NOTE

















Assessment/Plan


(1) Pancreatitis


Current Visit: Yes   Status: Acute   


Qualifiers: 


   Chronicity: acute   Pancreatitis type: unspecified pancreatitis type   Acute 

pancreatitis complication: no infection or necrosis   Qualified Code(s): K85.90 

- Acute pancreatitis without necrosis or infection, unspecified   


Code(s): K85.90 - ACUTE PANCREATITIS WITHOUT NECROSIS OR INFECTION, UNSP   





(2) Acute renal failure syndrome


Current Visit: Yes   Status: Acute   


Qualifiers: 


   Acute renal failure type: unspecified   Qualified Code(s): N17.9 - Acute 

kidney failure, unspecified   





(3) Type 2 diabetes mellitus


Current Visit: Yes   Status: Acute   


Qualifiers: 


   Diabetes mellitus long term insulin use: without long term use   Diabetes 

mellitus complication status: with ketoacidosis   Diabetes mellitus complication

 detail: without coma   Qualified Code(s): E11.10 - Type 2 diabetes mellitus 

with ketoacidosis without coma   





(4) Metabolic acidosis due to diabetes mellitus


Current Visit: Yes   Status: Acute   Code(s): E11.69 - TYPE 2 DIABETES MELLITUS 

WITH OTHER SPECIFIED COMPLICATION; E87.2 - ACIDOSIS   





(5) Hypertension


Current Visit: Yes   Status: Chronic   


Qualifiers: 


   Hypertension type: essential hypertension   Qualified Code(s): I10 - 

Essential (primary) hypertension   


Code(s): I10 - ESSENTIAL (PRIMARY) HYPERTENSION   





(6) Dehydration


Current Visit: Yes   Status: Resolved   Code(s): E86.0 - DEHYDRATION

## 2020-10-06 LAB
ALBUMIN SERPL-MCNC: 2.9 G/DL (ref 3.5–5)
ALP SERPL-CCNC: 224 U/L (ref 38–126)
ALT SERPL-CCNC: 16 U/L (ref 0–35)
AMYLASE SERPL-CCNC: 101 U/L (ref 30–110)
ANION GAP SERPL CALC-SCNC: 17.2 MEQ/L (ref 5–15)
ARTERIAL PATENCY WRIST A: YES
AST SERPL QL: 20 U/L (ref 14–36)
BASE EXCESS BLDA CALC-SCNC: -12.2 MMOL/L (ref -2–2)
BASOPHILS # BLD AUTO: 0.01 10*3/UL (ref 0–0.4)
BASOPHILS NFR BLD AUTO: 0.2 % (ref 0–0.4)
BILIRUB BLD-MCNC: 0.4 MG/DL (ref 0.2–1.3)
BUN SERPL-MCNC: 45 MG/DL (ref 7–17)
CALCIUM SPEC-MCNC: 8.8 MG/DL (ref 8.4–10.2)
CHLORIDE SERPL-SCNC: 122 MMOL/L (ref 98–107)
CO2 SERPL-SCNC: 10 MMOL/L (ref 22–30)
COHGB BLD-MCNC: 1.1 % THGB (ref 0–6.9)
CREAT SERPL-MCNC: 3.17 MG/DL (ref 0.52–1.04)
EOSINOPHIL # BLD AUTO: 0.24 10*3/UL (ref 0–0.5)
GAS PNL BLDA: 37 C
GAS PNL BLDA: 8.3
GFR SERPLBLD BASED ON 1.73 SQ M-ARVRAT: 15.1 ML/MIN
GLUCOSE SERPL-MCNC: 134 MG/DL (ref 74–106)
HCO3 BLDA-SCNC: 11.9 MMOL/L (ref 22–28)
HCT VFR BLD AUTO: 25.5 % (ref 35–47)
HGB BLD-MCNC: 7.6 GM/DL (ref 12–16)
INHALED O2 CONCENTRATION: 21 %
LIPASE SERPL-CCNC: 415 U/L (ref 23–300)
LYMPHOCYTES # SPEC AUTO: 1.55 10*3/UL (ref 1–4.6)
MCH RBC QN AUTO: 30.4 PG (ref 26–32)
MCHC RBC AUTO-ENTMCNC: 29.8 G/DL (ref 32–36)
METHGB MFR BLDA: 1.2 % (ref 1.4–1.5)
MONOCYTES # BLD AUTO: 0.31 10*3/UL (ref 0–1.3)
O2 A-A PPRESDIFF RESPIRATORY: 25 MM[HG]
PCO2 BLDA: 23 MMHG (ref 35–45)
PLATELET # BLD AUTO: 142 K/MM3 (ref 150–450)
PO2 BLDA: 96 MMHG (ref 75–100)
POTASSIUM BLDA-SCNC: 3.8 MMOL/L (ref 3.5–5.1)
POTASSIUM SERPLBLD-SCNC: 3.8 MMOL/L (ref 3.5–5.1)
PROT SERPL-MCNC: 5.4 G/DL (ref 6.3–8.2)
RBC # BLD AUTO: 2.5 M/MM3 (ref 4.1–5.4)
SAO2 % BLDA FROM PO2: 0.79 %
SAO2 % BLDA: 97.1 G/DF (ref 94–100)
SAO2 % BLDA: 99.3 % (ref 95–100)
SODIUM SERPL-SCNC: 144 MMOL/L (ref 137–145)
SPECIMEN SOURCE: (no result)
WBC # BLD AUTO: 5.6 K/MM3 (ref 4–10.5)

## 2020-10-06 RX ADMIN — ACETAMINOPHEN PRN MG: 325 TABLET ORAL at 18:30

## 2020-10-06 RX ADMIN — PANTOPRAZOLE SODIUM SCH MG: 40 INJECTION, POWDER, FOR SOLUTION INTRAVENOUS at 09:00

## 2020-10-06 RX ADMIN — ONDANSETRON PRN MG: 2 INJECTION, SOLUTION INTRAMUSCULAR; INTRAVENOUS at 21:05

## 2020-10-06 RX ADMIN — METOPROLOL SUCCINATE SCH MG: 50 TABLET, EXTENDED RELEASE ORAL at 21:05

## 2020-10-06 RX ADMIN — CEFTRIAXONE SCH MLS/HR: 1 INJECTION, SOLUTION INTRAVENOUS at 09:01

## 2020-10-06 RX ADMIN — METOPROLOL SUCCINATE SCH: 50 TABLET, EXTENDED RELEASE ORAL at 09:18

## 2020-10-06 RX ADMIN — PROMETHAZINE HYDROCHLORIDE PRN MG: 25 INJECTION INTRAMUSCULAR; INTRAVENOUS at 23:28

## 2020-10-06 RX ADMIN — ASPIRIN SCH MG: 81 TABLET, COATED ORAL at 09:07

## 2020-10-06 RX ADMIN — PROMETHAZINE HYDROCHLORIDE PRN MG: 25 INJECTION INTRAMUSCULAR; INTRAVENOUS at 02:08

## 2020-10-06 NOTE — PCM.NOTE
Date and Time: 10/06/20  1949





Subjective Assessment: 





doing better





- Review of Systems


Constitutional: No Fever, No Chills


Eyes: No Symptoms


Ears, Nose, & Throat: No Symptoms


Respiratory: No Cough, No Short Of Breath


Cardiac: No Chest Pain, No Edema, No Syncope


Abdominal/Gastrointestinal: No Abdominal Pain, No Nausea, No Vomiting, No 

Diarrhea


Genitourinary Symptoms: No Dysuria


Musculoskeletal: No Back Pain, No Neck Pain


Skin: No Rash


Neurological: No Dizziness, No Focal Weakness, No Sensory Changes


Psychological: No Symptoms


Endocrine: No Symptoms


Hematologic/Lymphatic: No Symptoms


Immunological/Allergic: No Symptoms





Objective Exam


General Appearance: no apparent distress, alert


Neurologic Exam: alert, oriented x 3, cooperative, normal mood/affect, nml 

cerebellar function, sensation nml, No motor deficits


Skin Exam: normal color, warm, dry


Eye Exam: PERRL, EOMI, eyes nml inspection


Ears, Nose, Throat Exam: normal ENT inspection, pharynx normal, moist mucous 

membranes


Neck Exam: normal inspection, non-tender, supple, full range of motion


Respiratory Exam: normal breath sounds, lungs clear, No respiratory distress


Cardiovascular Exam: regular rate/rhythm, normal heart sounds


Gastrointestinal/Abdomen Exam: soft, No tenderness, No mass


Extremity Exam: normal inspection, normal range of motion


Back Exam: normal inspection, normal range of motion, No CVA tenderness, No 

vertebral tenderness


Pelvic Exam: deferred


Rectal Exam: deferred





OBJECTIVE DATA


Vital Signs: 


                               Vital Signs - 24 hr











  Temp Pulse Resp BP Pulse Ox


 


 10/06/20 16:00  98.1 F  63  20  140/61  95


 


 10/06/20 11:48  98.3 F  62  18  132/63  94 L


 


 10/06/20 07:32  97.6 F  60  20  107/56 


 


 10/06/20 04:00  98.1 F  76  24  117/58  96


 


 10/05/20 23:45  97.7 F  71  22  109/55  97








                        Pain Assessment - Last Documented











Pain Intensity                 0


 


Pain Scale Used                0-10 Pain Scale











Intake and Output: 


                                 Intake & Output











 10/04/20 10/05/20 10/06/20 10/07/20





 11:59 11:59 11:59 11:59


 


Intake Total 9956 3024 3799 1950


 


Output Total 1350 3550 4100 1200


 


Balance 8606 -526 -301 750


 


Weight 80.3 kg  80.3 kg 











Lab Results: 


                            Lab Results-Last 24 Hours











  10/05/20 10/06/20 10/06/20 Range/Units





  21:37 04:50 04:50 


 


WBC   5.6   (4.0-10.5)  K/mm3


 


RBC   2.50 L   (4.1-5.4)  M/mm3


 


Hgb   7.6 L   (12.0-16.0)  gm/dl


 


Hct   25.5 L   (35-47)  %


 


MCV   102.0 H   ()  fl


 


MCH   30.4   (26-32)  pg


 


MCHC   29.8 L   (32-36)  g/dl


 


RDW   16.1 H   (11.5-14.0)  %


 


Plt Count   142 L   (150-450)  K/mm3


 


MPV   11.8 H   (7.5-11.0)  fl


 


Gran %   62.4   (36.0-66.0)  %


 


Eos # (Auto)   0.24   (0-0.5)  


 


Absolute Lymphs (auto)   1.55   (1.0-4.6)  


 


Absolute Monos (auto)   0.31   (0.0-1.3)  


 


Lymphocytes %   27.6   (24.0-44.0)  %


 


Monocytes %   5.5   (0.0-12.0)  %


 


Eosinophils %   4.3   (0.00-5.0)  %


 


Basophils %   0.2   (0.0-0.4)  %


 


Absolute Granulocytes   3.50   (1.4-6.9)  


 


Basophils #   0.01   (0-0.4)  


 


Puncture Site     


 


pCO2     (35-45)  mmHg


 


pO2     ()  mmHg


 


Base Excess     (-2.0-2.0)  


 


O2 Saturation     ()  g/dF


 


ABG pH     (7.35-7.45)  


 


ABG HCO3     (22-28)  


 


ABG O2 Sat (Measured)     ()  %


 


Randy Test     


 


A-a Gradient     


 


a/A Ratio     


 


Hemoglobin     


 


Carboxyhemoglobin     (0.0-6.9)  % THgb


 


Methemoglobin     (1.4-1.5)  %


 


Temperature     C


 


POC O2 Flow Rate     %


 


Sodium    144  (137-145)  mmol/L


 


Potassium    3.8  (3.5-5.1)  mmol/L


 


Chloride    122 H  ()  mmol/L


 


Carbon Dioxide    10 L*  (22-30)  mmol/L


 


Anion Gap    17.2 H  (5-15)  MEQ/L


 


BUN    45 H  (7-17)  mg/dL


 


Creatinine    3.17 H  (0.52-1.04)  mg/dL


 


Estimated GFR    15.1  ML/MIN


 


Glucose    134 H  ()  mg/dL


 


POC Glucometer  114 H    (74 to 106)  mg/dL


 


Calcium    8.8  (8.4-10.2)  mg/dL


 


Total Bilirubin    0.40  (0.2-1.3)  mg/dL


 


AST    20  (14-36)  U/L


 


ALT    16  (0-35)  U/L


 


Alkaline Phosphatase    224 H  ()  U/L


 


Serum Total Protein    5.4 L  (6.3-8.2)  g/dL


 


Albumin    2.9 L  (3.5-5.0)  g/dL


 


Amylase    101  ()  U/L


 


Lipase    415 H  ()  U/L














  10/06/20 10/06/20 10/06/20 Range/Units





  06:47 09:12 10:46 


 


WBC     (4.0-10.5)  K/mm3


 


RBC     (4.1-5.4)  M/mm3


 


Hgb     (12.0-16.0)  gm/dl


 


Hct     (35-47)  %


 


MCV     ()  fl


 


MCH     (26-32)  pg


 


MCHC     (32-36)  g/dl


 


RDW     (11.5-14.0)  %


 


Plt Count     (150-450)  K/mm3


 


MPV     (7.5-11.0)  fl


 


Gran %     (36.0-66.0)  %


 


Eos # (Auto)     (0-0.5)  


 


Absolute Lymphs (auto)     (1.0-4.6)  


 


Absolute Monos (auto)     (0.0-1.3)  


 


Lymphocytes %     (24.0-44.0)  %


 


Monocytes %     (0.0-12.0)  %


 


Eosinophils %     (0.00-5.0)  %


 


Basophils %     (0.0-0.4)  %


 


Absolute Granulocytes     (1.4-6.9)  


 


Basophils #     (0-0.4)  


 


Puncture Site   LEFT RADIAL   


 


pCO2   23 L   (35-45)  mmHg


 


pO2   96   ()  mmHg


 


Base Excess   -12.2 L   (-2.0-2.0)  


 


O2 Saturation   97.1   ()  g/dF


 


ABG pH   7.32 L   (7.35-7.45)  


 


ABG HCO3   11.9 L*   (22-28)  


 


ABG O2 Sat (Measured)   99.3   ()  %


 


Randy Test   YES   


 


A-a Gradient   25   


 


a/A Ratio   0.79   


 


Hemoglobin   8.3   


 


Carboxyhemoglobin   1.1   (0.0-6.9)  % THgb


 


Methemoglobin   1.2 L   (1.4-1.5)  %


 


Temperature   37.0   C


 


POC O2 Flow Rate   21   %


 


Sodium     (137-145)  mmol/L


 


Potassium   3.8   (3.5-5.1)  mmol/L


 


Chloride     ()  mmol/L


 


Carbon Dioxide     (22-30)  mmol/L


 


Anion Gap     (5-15)  MEQ/L


 


BUN     (7-17)  mg/dL


 


Creatinine     (0.52-1.04)  mg/dL


 


Estimated GFR     ML/MIN


 


Glucose     ()  mg/dL


 


POC Glucometer  129 H   134 H  (74 to 106)  mg/dL


 


Calcium     (8.4-10.2)  mg/dL


 


Total Bilirubin     (0.2-1.3)  mg/dL


 


AST     (14-36)  U/L


 


ALT     (0-35)  U/L


 


Alkaline Phosphatase     ()  U/L


 


Serum Total Protein     (6.3-8.2)  g/dL


 


Albumin     (3.5-5.0)  g/dL


 


Amylase     ()  U/L


 


Lipase     ()  U/L














  10/06/20 Range/Units





  15:51 


 


WBC   (4.0-10.5)  K/mm3


 


RBC   (4.1-5.4)  M/mm3


 


Hgb   (12.0-16.0)  gm/dl


 


Hct   (35-47)  %


 


MCV   ()  fl


 


MCH   (26-32)  pg


 


MCHC   (32-36)  g/dl


 


RDW   (11.5-14.0)  %


 


Plt Count   (150-450)  K/mm3


 


MPV   (7.5-11.0)  fl


 


Gran %   (36.0-66.0)  %


 


Eos # (Auto)   (0-0.5)  


 


Absolute Lymphs (auto)   (1.0-4.6)  


 


Absolute Monos (auto)   (0.0-1.3)  


 


Lymphocytes %   (24.0-44.0)  %


 


Monocytes %   (0.0-12.0)  %


 


Eosinophils %   (0.00-5.0)  %


 


Basophils %   (0.0-0.4)  %


 


Absolute Granulocytes   (1.4-6.9)  


 


Basophils #   (0-0.4)  


 


Puncture Site   


 


pCO2   (35-45)  mmHg


 


pO2   ()  mmHg


 


Base Excess   (-2.0-2.0)  


 


O2 Saturation   ()  g/dF


 


ABG pH   (7.35-7.45)  


 


ABG HCO3   (22-28)  


 


ABG O2 Sat (Measured)   ()  %


 


Randy Test   


 


A-a Gradient   


 


a/A Ratio   


 


Hemoglobin   


 


Carboxyhemoglobin   (0.0-6.9)  % THgb


 


Methemoglobin   (1.4-1.5)  %


 


Temperature   C


 


POC O2 Flow Rate   %


 


Sodium   (137-145)  mmol/L


 


Potassium   (3.5-5.1)  mmol/L


 


Chloride   ()  mmol/L


 


Carbon Dioxide   (22-30)  mmol/L


 


Anion Gap   (5-15)  MEQ/L


 


BUN   (7-17)  mg/dL


 


Creatinine   (0.52-1.04)  mg/dL


 


Estimated GFR   ML/MIN


 


Glucose   ()  mg/dL


 


POC Glucometer  107 H  (74 to 106)  mg/dL


 


Calcium   (8.4-10.2)  mg/dL


 


Total Bilirubin   (0.2-1.3)  mg/dL


 


AST   (14-36)  U/L


 


ALT   (0-35)  U/L


 


Alkaline Phosphatase   ()  U/L


 


Serum Total Protein   (6.3-8.2)  g/dL


 


Albumin   (3.5-5.0)  g/dL


 


Amylase   ()  U/L


 


Lipase   ()  U/L











Multi-Disciplinary Progress Notes: 


                        Multi-Disciplinary Progress Notes





10/06/20 09:24 Case Management Note by Nicolasa Crowder


PATIENT STILL FEELING ILL, WILL CONTINUE TO MONITOR FOR NEEDS AT NH. 





Initialized on 10/06/20 09:24 - END OF NOTE

















Assessment/Plan


(1) Pancreatitis


Current Visit: Yes   Status: Acute   


Qualifiers: 


   Chronicity: acute   Pancreatitis type: unspecified pancreatitis type   Acute 

pancreatitis complication: no infection or necrosis   Qualified Code(s): K85.90 

- Acute pancreatitis without necrosis or infection, unspecified   


Assessment & Plan: 


improving


Code(s): K85.90 - ACUTE PANCREATITIS WITHOUT NECROSIS OR INFECTION, UNSP   





(2) Acute renal failure syndrome


Current Visit: Yes   Status: Acute   


Qualifiers: 


   Acute renal failure type: unspecified   Qualified Code(s): N17.9 - Acute 

kidney failure, unspecified   





(3) Type 2 diabetes mellitus


Current Visit: Yes   Status: Acute   


Qualifiers: 


   Diabetes mellitus long term insulin use: without long term use   Diabetes 

mellitus complication status: with ketoacidosis   Diabetes mellitus complication

detail: without coma   Qualified Code(s): E11.10 - Type 2 diabetes mellitus with

ketoacidosis without coma   





(4) Metabolic acidosis due to diabetes mellitus


Current Visit: Yes   Status: Acute   


Assessment & Plan: 


improving


Code(s): E11.69 - TYPE 2 DIABETES MELLITUS WITH OTHER SPECIFIED COMPLICATION; 

E87.2 - ACIDOSIS   





(5) Hypertension


Current Visit: Yes   Status: Chronic   


Qualifiers: 


   Hypertension type: essential hypertension   Qualified Code(s): I10 - 

Essential (primary) hypertension   


Code(s): I10 - ESSENTIAL (PRIMARY) HYPERTENSION   





(6) Dehydration


Current Visit: Yes   Status: Resolved   Code(s): E86.0 - DEHYDRATION

## 2020-10-07 LAB
ALBUMIN SERPL-MCNC: 3 G/DL (ref 3.5–5)
ALP SERPL-CCNC: 210 U/L (ref 38–126)
ALT SERPL-CCNC: 13 U/L (ref 0–35)
AMYLASE SERPL-CCNC: 69 U/L (ref 30–110)
ANION GAP SERPL CALC-SCNC: 18.4 MEQ/L (ref 5–15)
AST SERPL QL: 17 U/L (ref 14–36)
BILIRUB BLD-MCNC: 0.4 MG/DL (ref 0.2–1.3)
BUN SERPL-MCNC: 33 MG/DL (ref 7–17)
CALCIUM SPEC-MCNC: 8.9 MG/DL (ref 8.4–10.2)
CHLORIDE SERPL-SCNC: 124 MMOL/L (ref 98–107)
CO2 SERPL-SCNC: 11 MMOL/L (ref 22–30)
CREAT SERPL-MCNC: 2.3 MG/DL (ref 0.52–1.04)
GFR SERPLBLD BASED ON 1.73 SQ M-ARVRAT: 21.9 ML/MIN
GLUCOSE SERPL-MCNC: 148 MG/DL (ref 74–106)
HCT VFR BLD AUTO: 25.2 % (ref 35–47)
HGB BLD-MCNC: 7.6 GM/DL (ref 12–16)
LIPASE SERPL-CCNC: 289 U/L (ref 23–300)
MCH RBC QN AUTO: 30.3 PG (ref 26–32)
MCHC RBC AUTO-ENTMCNC: 30.2 G/DL (ref 32–36)
PLATELET # BLD AUTO: 128 K/MM3 (ref 150–450)
POTASSIUM SERPLBLD-SCNC: 3.9 MMOL/L (ref 3.5–5.1)
PROT SERPL-MCNC: 5.5 G/DL (ref 6.3–8.2)
RBC # BLD AUTO: 2.51 M/MM3 (ref 4.1–5.4)
SODIUM SERPL-SCNC: 149 MMOL/L (ref 137–145)
WBC # BLD AUTO: 5.8 K/MM3 (ref 4–10.5)

## 2020-10-07 RX ADMIN — ONDANSETRON PRN MG: 2 INJECTION, SOLUTION INTRAMUSCULAR; INTRAVENOUS at 20:14

## 2020-10-07 RX ADMIN — PROMETHAZINE HYDROCHLORIDE PRN MG: 25 INJECTION INTRAMUSCULAR; INTRAVENOUS at 09:26

## 2020-10-07 RX ADMIN — CEFTRIAXONE SCH MLS/HR: 1 INJECTION, SOLUTION INTRAVENOUS at 09:31

## 2020-10-07 RX ADMIN — METOPROLOL SUCCINATE SCH: 50 TABLET, EXTENDED RELEASE ORAL at 21:21

## 2020-10-07 RX ADMIN — PANTOPRAZOLE SODIUM SCH MG: 40 INJECTION, POWDER, FOR SOLUTION INTRAVENOUS at 09:25

## 2020-10-07 RX ADMIN — MAGNESIUM SULFATE IN DEXTROSE SCH MLS/HR: 10 INJECTION, SOLUTION INTRAVENOUS at 11:06

## 2020-10-07 RX ADMIN — MAGNESIUM SULFATE IN DEXTROSE SCH MLS/HR: 10 INJECTION, SOLUTION INTRAVENOUS at 10:01

## 2020-10-07 RX ADMIN — METOPROLOL SUCCINATE SCH: 50 TABLET, EXTENDED RELEASE ORAL at 09:31

## 2020-10-07 RX ADMIN — ASPIRIN SCH MG: 81 TABLET, COATED ORAL at 09:25

## 2020-10-08 LAB
ALBUMIN SERPL-MCNC: 2.9 G/DL (ref 3.5–5)
ALP SERPL-CCNC: 198 U/L (ref 38–126)
ALT SERPL-CCNC: 11 U/L (ref 0–35)
ANION GAP SERPL CALC-SCNC: 11.7 MEQ/L (ref 5–15)
AST SERPL QL: 16 U/L (ref 14–36)
BILIRUB BLD-MCNC: 0.4 MG/DL (ref 0.2–1.3)
BUN SERPL-MCNC: 27 MG/DL (ref 7–17)
CALCIUM SPEC-MCNC: 8.7 MG/DL (ref 8.4–10.2)
CHLORIDE SERPL-SCNC: 124 MMOL/L (ref 98–107)
CO2 SERPL-SCNC: 14 MMOL/L (ref 22–30)
CREAT SERPL-MCNC: 1.7 MG/DL (ref 0.52–1.04)
GFR SERPLBLD BASED ON 1.73 SQ M-ARVRAT: 31.1 ML/MIN
GLUCOSE SERPL-MCNC: 150 MG/DL (ref 74–106)
HCT VFR BLD AUTO: 24.6 % (ref 35–47)
HGB BLD-MCNC: 7.4 GM/DL (ref 12–16)
MAGNESIUM SERPL-MCNC: 1.3 MG/DL (ref 1.6–2.3)
MCH RBC QN AUTO: 30 PG (ref 26–32)
MCHC RBC AUTO-ENTMCNC: 30.1 G/DL (ref 32–36)
PLATELET # BLD AUTO: 125 K/MM3 (ref 150–450)
POTASSIUM SERPLBLD-SCNC: 3.4 MMOL/L (ref 3.5–5.1)
PROT SERPL-MCNC: 5.3 G/DL (ref 6.3–8.2)
RBC # BLD AUTO: 2.47 M/MM3 (ref 4.1–5.4)
SODIUM SERPL-SCNC: 146 MMOL/L (ref 137–145)
WBC # BLD AUTO: 6.8 K/MM3 (ref 4–10.5)

## 2020-10-08 RX ADMIN — ASPIRIN SCH MG: 81 TABLET, COATED ORAL at 09:47

## 2020-10-08 RX ADMIN — PANTOPRAZOLE SODIUM SCH MG: 40 INJECTION, POWDER, FOR SOLUTION INTRAVENOUS at 09:47

## 2020-10-08 RX ADMIN — CEFTRIAXONE SCH MLS/HR: 1 INJECTION, SOLUTION INTRAVENOUS at 09:49

## 2020-10-08 RX ADMIN — METOPROLOL SUCCINATE SCH MG: 50 TABLET, EXTENDED RELEASE ORAL at 23:38

## 2020-10-08 RX ADMIN — METOPROLOL SUCCINATE SCH MG: 50 TABLET, EXTENDED RELEASE ORAL at 09:49

## 2020-10-08 RX ADMIN — MAGNESIUM SULFATE IN DEXTROSE SCH MLS/HR: 10 INJECTION, SOLUTION INTRAVENOUS at 13:32

## 2020-10-08 RX ADMIN — MAGNESIUM SULFATE IN DEXTROSE SCH MLS/HR: 10 INJECTION, SOLUTION INTRAVENOUS at 12:12

## 2020-10-08 NOTE — PCM.NOTE
Date and Time: 10/08/20  1139





Subjective Assessment: 





doing ok, eating better





- Review of Systems


Constitutional: No Fever, No Chills


Eyes: No Symptoms


Ears, Nose, & Throat: No Symptoms


Respiratory: No Cough, No Short Of Breath


Cardiac: No Chest Pain, No Edema, No Syncope


Abdominal/Gastrointestinal: No Abdominal Pain, No Nausea, No Vomiting, No 

Diarrhea


Genitourinary Symptoms: No Dysuria


Musculoskeletal: No Back Pain, No Neck Pain


Skin: No Rash


Neurological: No Dizziness, No Focal Weakness, No Sensory Changes


Psychological: No Symptoms


Endocrine: No Symptoms


Hematologic/Lymphatic: No Symptoms


Immunological/Allergic: No Symptoms





Objective Exam


General Appearance: no apparent distress, alert


Neurologic Exam: alert, oriented x 3, cooperative, normal mood/affect, nml 

cerebellar function, sensation nml, No motor deficits


Skin Exam: normal color, warm, dry


Eye Exam: PERRL, EOMI, eyes nml inspection


Ears, Nose, Throat Exam: normal ENT inspection, pharynx normal, moist mucous 

membranes


Neck Exam: normal inspection, non-tender, supple, full range of motion


Respiratory Exam: normal breath sounds, lungs clear, No respiratory distress


Cardiovascular Exam: regular rate/rhythm, normal heart sounds


Gastrointestinal/Abdomen Exam: soft, No tenderness, No mass


Extremity Exam: normal inspection, normal range of motion


Back Exam: normal inspection, normal range of motion, No CVA tenderness, No 

vertebral tenderness


Pelvic Exam: deferred


Rectal Exam: deferred





OBJECTIVE DATA


Vital Signs: 


                               Vital Signs - 24 hr











  Temp Pulse Resp BP Pulse Ox


 


 10/08/20 11:19  97.5 F  50 L  18  155/72  96


 


 10/08/20 06:52  97.6 F  59 L  16  148/67  94 L


 


 10/08/20 03:56  97.9 F  65  18  150/67  94 L


 


 10/07/20 23:52  97.9 F  56 L  17  134/64  97


 


 10/07/20 20:00  98.3 F  57 L  17  162/67  96


 


 10/07/20 16:00  98.1 F  53 L  16  145/65  98


 


 10/07/20 11:53  97.6 F  57 L  16  136/57  96








                        Pain Assessment - Last Documented











Pain Intensity                 0


 


Pain Scale Used                0-10 Pain Scale











Intake and Output: 


                                 Intake & Output











 10/05/20 10/06/20 10/07/20 10/08/20





 11:59 11:59 11:59 11:59


 


Intake Total 8077 9347 3270 3984


 


Output Total 5175 0010 2900 1600


 


Balance -526 -


 


Weight  80.3 kg  











Lab Results: 


                            Lab Results-Last 24 Hours











  10/07/20 10/07/20 10/07/20 Range/Units





  11:36 16:22 20:26 


 


WBC     (4.0-10.5)  K/mm3


 


RBC     (4.1-5.4)  M/mm3


 


Hgb     (12.0-16.0)  gm/dl


 


Hct     (35-47)  %


 


MCV     ()  fl


 


MCH     (26-32)  pg


 


MCHC     (32-36)  g/dl


 


RDW     (11.5-14.0)  %


 


Plt Count     (150-450)  K/mm3


 


MPV     (7.5-11.0)  fl


 


Sodium     (137-145)  mmol/L


 


Potassium     (3.5-5.1)  mmol/L


 


Chloride     ()  mmol/L


 


Carbon Dioxide     (22-30)  mmol/L


 


Anion Gap     (5-15)  MEQ/L


 


BUN     (7-17)  mg/dL


 


Creatinine     (0.52-1.04)  mg/dL


 


Estimated GFR     ML/MIN


 


Glucose     ()  mg/dL


 


POC Glucometer  169 H  152 H  147 H  (74 to 106)  mg/dL


 


Calcium     (8.4-10.2)  mg/dL


 


Magnesium     (1.6-2.3)  mg/dL


 


Total Bilirubin     (0.2-1.3)  mg/dL


 


AST     (14-36)  U/L


 


ALT     (0-35)  U/L


 


Alkaline Phosphatase     ()  U/L


 


Serum Total Protein     (6.3-8.2)  g/dL


 


Albumin     (3.5-5.0)  g/dL














  10/08/20 10/08/20 10/08/20 Range/Units





  04:20 04:20 07:07 


 


WBC  6.8    (4.0-10.5)  K/mm3


 


RBC  2.47 L    (4.1-5.4)  M/mm3


 


Hgb  7.4 L    (12.0-16.0)  gm/dl


 


Hct  24.6 L    (35-47)  %


 


MCV  99.6    ()  fl


 


MCH  30.0    (26-32)  pg


 


MCHC  30.1 L    (32-36)  g/dl


 


RDW  17.1 H    (11.5-14.0)  %


 


Plt Count  125 L    (150-450)  K/mm3


 


MPV  12.4 H    (7.5-11.0)  fl


 


Sodium   146 H   (137-145)  mmol/L


 


Potassium   3.4 L   (3.5-5.1)  mmol/L


 


Chloride   124 H   ()  mmol/L


 


Carbon Dioxide   14 L*   (22-30)  mmol/L


 


Anion Gap   11.7   (5-15)  MEQ/L


 


BUN   27 H   (7-17)  mg/dL


 


Creatinine   1.70 H   (0.52-1.04)  mg/dL


 


Estimated GFR   31.1   ML/MIN


 


Glucose   150 H   ()  mg/dL


 


POC Glucometer    140 H  (74 to 106)  mg/dL


 


Calcium   8.7   (8.4-10.2)  mg/dL


 


Magnesium   1.3 L   (1.6-2.3)  mg/dL


 


Total Bilirubin   0.40   (0.2-1.3)  mg/dL


 


AST   16   (14-36)  U/L


 


ALT   11   (0-35)  U/L


 


Alkaline Phosphatase   198 H   ()  U/L


 


Serum Total Protein   5.3 L   (6.3-8.2)  g/dL


 


Albumin   2.9 L   (3.5-5.0)  g/dL














  10/08/20 Range/Units





  11:09 


 


WBC   (4.0-10.5)  K/mm3


 


RBC   (4.1-5.4)  M/mm3


 


Hgb   (12.0-16.0)  gm/dl


 


Hct   (35-47)  %


 


MCV   ()  fl


 


MCH   (26-32)  pg


 


MCHC   (32-36)  g/dl


 


RDW   (11.5-14.0)  %


 


Plt Count   (150-450)  K/mm3


 


MPV   (7.5-11.0)  fl


 


Sodium   (137-145)  mmol/L


 


Potassium   (3.5-5.1)  mmol/L


 


Chloride   ()  mmol/L


 


Carbon Dioxide   (22-30)  mmol/L


 


Anion Gap   (5-15)  MEQ/L


 


BUN   (7-17)  mg/dL


 


Creatinine   (0.52-1.04)  mg/dL


 


Estimated GFR   ML/MIN


 


Glucose   ()  mg/dL


 


POC Glucometer  140 H  (74 to 106)  mg/dL


 


Calcium   (8.4-10.2)  mg/dL


 


Magnesium   (1.6-2.3)  mg/dL


 


Total Bilirubin   (0.2-1.3)  mg/dL


 


AST   (14-36)  U/L


 


ALT   (0-35)  U/L


 


Alkaline Phosphatase   ()  U/L


 


Serum Total Protein   (6.3-8.2)  g/dL


 


Albumin   (3.5-5.0)  g/dL











Multi-Disciplinary Progress Notes: 


                        Multi-Disciplinary Progress Notes





10/07/20 15:23 Physical Therapy Note by Naya Majano


PT. REPORTS FEELING SLIGHTLY LESS NAUSEOUS BUT HAS ONLY BEEN TAKING IN A SMALL 

AMOUNT OF CLEAR LIQUIDS.   IN CHAIR THIS AM UPON PT ARRIVAL TO ROOM.  SIT TO 

STAND MIN ASSIST.  NO DIZZINESS NOTE W/ SIT TO STAND.  PT. AMBULATED ~ 80' WHILE

HOLDING ON TO IV POLE AND HAND-HELD ASSIST.  NO UNSTEADINESS; SLIGHT DECREASED 

STRIDE LENGTH.  DID TAKE EMESIS BAG W/ HER BUT DID NOT VOMIT.  PT. WANTED TO LIE

DOWN AFTER WALKING AND PERFORMED SIT TO SUPIEN W/ CGA.  O2 SATS 95-96% ON RA 

THROUGHOUT  RX.  PT. WALKED ~ 120' IN PM W/ CGA AND HOLDING ON TO IV POLE.  

PERFORMED SUPINE TO SIT W/ MIN ASSIST W/ HOB ELEVATED.  SIT TO STAND CGA FROM 

BED.  NO VOMITING IN PM W/ FUNCTIONAL MOBILITY EITHER.  WILL CONT. PT 5X/WK TO 

PROGRESS STRENTGH AND GAIT TOLERANCE AS WELL AS OVERALL INDEPENDENCE W/ 

FUNCTIONAL MOBILITY.





NAYA MAJANO, PT 





Initialized on 10/07/20 15:23 - END OF NOTE

















Assessment/Plan


(1) Metabolic acidosis with normal anion gap and bicarbonate losses


Current Visit: Yes   Status: Acute   


Assessment & Plan: 


improving


Code(s): E87.2 - ACIDOSIS   





(2) Pancreatitis


Current Visit: Yes   Status: Acute   


Qualifiers: 


   Chronicity: acute   Pancreatitis type: unspecified pancreatitis type   Acute 

pancreatitis complication: no infection or necrosis   Qualified Code(s): K85.90 

- Acute pancreatitis without necrosis or infection, unspecified   


Code(s): K85.90 - ACUTE PANCREATITIS WITHOUT NECROSIS OR INFECTION, UNSP   





(3) Acute renal failure syndrome


Current Visit: Yes   Status: Acute   


Qualifiers: 


   Acute renal failure type: unspecified   Qualified Code(s): N17.9 - Acute 

kidney failure, unspecified   





(4) Type 2 diabetes mellitus


Current Visit: Yes   Status: Acute   


Qualifiers: 


   Diabetes mellitus long term insulin use: without long term use   Diabetes 

mellitus complication status: with ketoacidosis   Diabetes mellitus complication

detail: without coma   Qualified Code(s): E11.10 - Type 2 diabetes mellitus with

ketoacidosis without coma   





(5) Metabolic acidosis due to diabetes mellitus


Current Visit: Yes   Status: Acute   Code(s): E11.69 - TYPE 2 DIABETES MELLITUS 

WITH OTHER SPECIFIED COMPLICATION; E87.2 - ACIDOSIS   





(6) Hypertension


Current Visit: Yes   Status: Chronic   


Qualifiers: 


   Hypertension type: essential hypertension   Qualified Code(s): I10 - 

Essential (primary) hypertension   


Code(s): I10 - ESSENTIAL (PRIMARY) HYPERTENSION   





(7) Dehydration


Current Visit: Yes   Status: Resolved   Code(s): E86.0 - DEHYDRATION

## 2020-10-09 LAB
ALBUMIN SERPL-MCNC: 2.7 G/DL (ref 3.5–5)
ALP SERPL-CCNC: 188 U/L (ref 38–126)
ALT SERPL-CCNC: 10 U/L (ref 0–35)
ANION GAP SERPL CALC-SCNC: 11.7 MEQ/L (ref 5–15)
AST SERPL QL: 15 U/L (ref 14–36)
BASOPHILS # BLD AUTO: 0.01 10*3/UL (ref 0–0.4)
BASOPHILS NFR BLD AUTO: 0.1 % (ref 0–0.4)
BILIRUB BLD-MCNC: 0.4 MG/DL (ref 0.2–1.3)
BUN SERPL-MCNC: 21 MG/DL (ref 7–17)
CALCIUM SPEC-MCNC: 8.5 MG/DL (ref 8.4–10.2)
CHLORIDE SERPL-SCNC: 121 MMOL/L (ref 98–107)
CO2 SERPL-SCNC: 15 MMOL/L (ref 22–30)
CREAT SERPL-MCNC: 1.38 MG/DL (ref 0.52–1.04)
EOSINOPHIL # BLD AUTO: 0.19 10*3/UL (ref 0–0.5)
GFR SERPLBLD BASED ON 1.73 SQ M-ARVRAT: 39.5 ML/MIN
GLUCOSE SERPL-MCNC: 146 MG/DL (ref 74–106)
HCT VFR BLD AUTO: 24.7 % (ref 35–47)
HGB BLD-MCNC: 7.5 GM/DL (ref 12–16)
LYMPHOCYTES # SPEC AUTO: 0.71 10*3/UL (ref 1–4.6)
MCH RBC QN AUTO: 30.1 PG (ref 26–32)
MCHC RBC AUTO-ENTMCNC: 30.4 G/DL (ref 32–36)
MONOCYTES # BLD AUTO: 0.35 10*3/UL (ref 0–1.3)
PLATELET # BLD AUTO: 118 K/MM3 (ref 150–450)
POTASSIUM SERPLBLD-SCNC: 3.2 MMOL/L (ref 3.5–5.1)
PROT SERPL-MCNC: 5.1 G/DL (ref 6.3–8.2)
RBC # BLD AUTO: 2.49 M/MM3 (ref 4.1–5.4)
SODIUM SERPL-SCNC: 145 MMOL/L (ref 137–145)
WBC # BLD AUTO: 7 K/MM3 (ref 4–10.5)

## 2020-10-09 RX ADMIN — METOPROLOL SUCCINATE SCH MG: 50 TABLET, EXTENDED RELEASE ORAL at 21:00

## 2020-10-09 RX ADMIN — CEFTRIAXONE SCH MLS/HR: 1 INJECTION, SOLUTION INTRAVENOUS at 10:16

## 2020-10-09 RX ADMIN — ASPIRIN SCH MG: 81 TABLET, COATED ORAL at 10:15

## 2020-10-09 RX ADMIN — METOPROLOL SUCCINATE SCH MG: 50 TABLET, EXTENDED RELEASE ORAL at 10:15

## 2020-10-09 RX ADMIN — PANTOPRAZOLE SODIUM SCH MG: 40 INJECTION, POWDER, FOR SOLUTION INTRAVENOUS at 07:34

## 2020-10-09 NOTE — PCM.DS
Discharge Summary


Date of Admission: 


10/03/20 12:18





Date of Discharge: 





10/10/2020


Admitting Physician: 


MARCIANO GIL





Primary Care Provider: 


MARCIANO GIL








Allergies


Allergies





No Known Drug Allergies Allergy (Verified 10/03/20 10:37)


   











Hospital Summary





- Hospital Course


Hospital Course: 








                                 Chief Complaint





Diagnosis                        DEHYDRATION, ARF





                                    Allergies











Allergy/AdvReac Type Severity Reaction Status Date / Time


 


No Known Drug Allergies Allergy   Verified 10/03/20 10:37








                           Vital Signs (Last 24 hours)











  Temp Pulse Resp BP Pulse Ox


 


 10/09/20 15:50  98.3 F  61  20  154/68  95


 


 10/09/20 12:00  98.1 F  51 L  18  130/62  95


 


 10/09/20 07:21  97.8 F  60  20  132/60  96


 


 10/09/20 03:55  98.0 F  64  18  130/60  93 L


 


 10/08/20 23:59  98.6 F  58 L  14  150/72  95


 


 10/08/20 20:00  97.8 F  55 L  24  151/70  95








                                Home Medications











 Medication  Instructions  Recorded  Confirmed  Last Taken  Type


 


Empagliflozin [Jardiance] 10 mg PO QAM 10/03/20 10/03/20 10/02/20 History


 


Lisinopril/Hydrochlorothiazide 1 tab PO DAILY 10/03/20 10/03/20 10/02/20 History





[Lisinopril-Hctz 10-12.5 mg Tab]     


 


Ondansetron HCl 4 mg SL Q8H 10/03/20 10/03/20 10/02/20 History


 


Simvastatin 40 mg PO HS 10/03/20 10/03/20 10/02/20 History


 


allopurinoL [Allopurinol] 300 mg PO BID 10/03/20 10/03/20 10/02/20 History








                               Current Medications











Generic Name Dose Route Start Last Admin





  Trade Name Freq  PRN Reason Stop Dose Admin


 


Acetaminophen  650 mg  10/03/20 16:22  10/06/20 18:30





  Tylenol 325 Mg***  PO  11/02/20 16:21  650 mg





  Q4H PRN PRN   Administration





  PAIN AND/OR FEVER  


 


Aspirin  81 mg  10/03/20 14:00  10/09/20 10:15





  Ecotrin 81 Mg***  PO  11/02/20 13:59  81 mg





  DAILY BETZAIDA   Administration


 


Sodium Chloride  1,000 mls @ 50 mls/hr  10/03/20 12:25  10/09/20 07:34





  Sodium Chloride 0.9% 1000 Ml  IV  11/02/20 12:24  150 mls/hr





  .Q20H BETZAIDA   Administration


 


Ceftriaxone Sodium/Dextrose  1 g in 50 mls @ 100 mls/hr  10/04/20 10:00  

10/09/20 10:16





  Rocephin 1 Gm-D5w 50 Ml Bag**  IV  11/03/20 09:59  100 mls/hr





  Q24H10 BETZAIDA   Administration


 


Insulin Human Regular  0 unit  10/03/20 12:25 





  Humulin R  SQ  11/02/20 12:24 





  UD PRN  





  HYPERGLYCEMIA  


 


Metoprolol Succinate  50 mg  10/03/20 14:00  10/09/20 10:15





  Toprol Xl 50 Mg***  PO  11/02/20 13:59  50 mg





  BID BETZAIDA   Administration


 


Ondansetron HCl  4 mg  10/05/20 12:22  10/07/20 20:14





  Zofran 4 Mg/2 Ml Vial**  IV  11/02/20 12:24  4 mg





  Q4H PRN   Administration





  NAUSEA/VOMITING  


 


Pantoprazole Sodium  40 mg  10/04/20 09:15  10/09/20 07:34





  Protonix 40 Mg Iv***  IV  11/03/20 09:14  40 mg





  Q24H BETZAIDA   Administration


 


Promethazine HCl  12.5 mg  10/05/20 15:13  10/07/20 09:26





  Phenergan 25 Mg Inj***  IV  11/04/20 15:12  12.5 mg





  Q6H PRN PRN   Administration





  NAUSEA/VOMITING  














Discontinued Medications














Generic Name Dose Route Start Last Admin





  Trade Name Freq  PRN Reason Stop Dose Admin


 


Sodium Chloride  1,000 mls @ 999 mls/hr  10/03/20 10:20  10/03/20 11:50





  Sodium Chloride 0.9% 1000 Ml  IV  10/03/20 11:20  Infused





  .Q1H1M STA   Infusion


 


Sodium Chloride  Confirm  10/03/20 10:46 





  Sodium Chloride 0.9% 1000 Ml  Administered  10/03/20 10:47 





  Dose  





  1,000 mls @ ud  





  .ROUTE  





  .STK-MED ONE  


 


Sodium Chloride  Confirm  10/03/20 11:32 





  Sodium Chloride 0.9% 1000 Ml  Administered  10/03/20 11:33 





  Dose  





  1,000 mls @ ud  





  .ROUTE  





  .STK-MED ONE  


 


Sodium Chloride  1,000 mls @ 999 mls/hr  10/03/20 11:51  10/03/20 11:52





  Sodium Chloride 0.9% 1000 Ml  IV  10/03/20 12:51  999 mls/hr





  .Q1H1M STA   Administration


 


Sodium Chloride  1,000 mls @ 999 mls/hr  10/03/20 16:16  10/03/20 16:44





  Sodium Chloride 0.9% 1000 Ml  IV  10/03/20 17:16  999 mls/hr





  .Q1H1M STA   Administration


 


Sodium Chloride  1,000 mls @ 999 mls/hr  10/03/20 17:30  10/03/20 17:38





  Sodium Chloride 0.9% 1000 Ml  IV  10/03/20 18:00  999 mls/hr





  .Q1H1M BETZAIDA   Administration


 


Sodium Chloride  1,000 mls @ 999 mls/hr  10/03/20 21:56  10/04/20 00:08





  Sodium Chloride 0.9% 1000 Ml  IV  10/03/20 22:56  999 mls/hr





  .Q1H1M STA   Administration


 


Magnesium Sulfate/Dextrose  100 mls @ 200 mls/hr  10/07/20 10:00  10/07/20 11:06





  Magnesium 1 Gm / 100 Ml D5w***  IV  10/07/20 11:29  200 mls/hr





  Q1H BETZAIDA   Administration


 


Magnesium Sulfate/Dextrose  100 mls @ 200 mls/hr  10/08/20 11:45  10/08/20 13:32





  Magnesium 1 Gm / 100 Ml D5w***  IV  10/08/20 13:14  200 mls/hr





  Q1H BETZAIDA   Administration


 


Ondansetron HCl  4 mg  10/03/20 10:20  10/03/20 10:49





  Zofran 4 Mg/2 Ml Vial**  IV  10/03/20 10:21  4 mg





  STAT ONE   Administration


 


Ondansetron HCl  Confirm  10/03/20 10:46 





  Zofran 4 Mg/2 Ml Vial**  Administered  10/03/20 10:47 





  Dose  





  4 mg  





  .ROUTE  





  .STK-MED ONE  


 


Ondansetron HCl  4 mg  10/03/20 12:25  10/05/20 12:19





  Zofran 4 Mg/2 Ml Vial**  IV  11/02/20 12:24  4 mg





  Q6H PRN PRN   Administration





  NAUSEA/VOMITING  


 


Pantoprazole Sodium  40 mg  10/03/20 10:20  10/03/20 10:49





  Protonix 40 Mg Iv***  IV  10/03/20 10:21  40 mg





  STAT ONE   Administration


 


Pantoprazole Sodium  Confirm  10/03/20 10:46 





  Protonix 40 Mg Iv***  Administered  10/03/20 10:47 





  Dose  





  40 mg  





  IV  





  .STK-MED ONE  


 


Sodium Chloride  10 ml  10/04/20 22:00  10/06/20 20:49





  Sodium Chloride 0.9% 10 Ml Flush Syringe  IV  11/03/20 21:59  Not Given





  Q8HT BETZAIDA  








                         Intake & Output (Last 24 hours)











 10/07/20 10/08/20 10/09/20 10/10/20





 11:59 11:59 11:59 11:59


 


Intake Total 3555 3986 4676 1688


 


Output Total 2900 1600 1600 1750


 


Balance 655 2386 3076 -62








                       Laboratory Results (Last 24 hours)











  10/09/20 10/09/20 10/09/20





  15:41 10:24 06:50


 


WBC   


 


RBC   


 


Hgb   


 


Hct   


 


MCV   


 


MCH   


 


MCHC   


 


RDW   


 


Plt Count   


 


MPV   


 


Gran %   


 


Eos # (Auto)   


 


Absolute Lymphs (auto)   


 


Absolute Monos (auto)   


 


Lymphocytes %   


 


Monocytes %   


 


Eosinophils %   


 


Basophils %   


 


Absolute Granulocytes   


 


Basophils #   


 


Sodium   


 


Potassium   


 


Chloride   


 


Carbon Dioxide   


 


Anion Gap   


 


BUN   


 


Creatinine   


 


Estimated GFR   


 


Glucose   


 


POC Glucometer  149 H  191 H  135 H


 


Calcium   


 


Total Bilirubin   


 


AST   


 


ALT   


 


Alkaline Phosphatase   


 


Serum Total Protein   


 


Albumin   














  10/09/20 10/09/20 10/08/20





  04:30 04:30 21:11


 


WBC   7.0 


 


RBC   2.49 L 


 


Hgb   7.5 L 


 


Hct   24.7 L 


 


MCV   99.2 


 


MCH   30.1 


 


MCHC   30.4 L 


 


RDW   16.8 H 


 


Plt Count   118 L 


 


MPV   12.3 H 


 


Gran %   82.1 H 


 


Eos # (Auto)   0.19 


 


Absolute Lymphs (auto)   0.71 L 


 


Absolute Monos (auto)   0.35 


 


Lymphocytes %   10.1 L 


 


Monocytes %   5.0 


 


Eosinophils %   2.7 


 


Basophils %   0.1 


 


Absolute Granulocytes   5.78 


 


Basophils #   0.01 


 


Sodium  145  


 


Potassium  3.2 L  


 


Chloride  121 H  


 


Carbon Dioxide  15 L*  


 


Anion Gap  11.7  


 


BUN  21 H  


 


Creatinine  1.38 H  


 


Estimated GFR  39.5  


 


Glucose  146 H  


 


POC Glucometer    139 H


 


Calcium  8.5  


 


Total Bilirubin  0.40  


 


AST  15  


 


ALT  10  


 


Alkaline Phosphatase  188 H  


 


Serum Total Protein  5.1 L  


 


Albumin  2.7 L  








                             Orders (Last 24 hours)











 Category Date Time Status


 


 CHEST 1 VIEW (PORTABLE) Urgent Exams  10/09/20 08:10 Completed


 


 CBC W DIFF AM.LAB Lab  10/09/20 04:30 Completed


 


 CMP AM.LAB Lab  10/09/20 04:30 Completed


 


 POCT GLUCOSE Stat Lab  10/08/20 16:36 Completed


 


 POCT GLUCOSE Stat Lab  10/08/20 21:11 Completed


 


 POCT GLUCOSE Stat Lab  10/09/20 06:50 Completed


 


 POCT GLUCOSE Stat Lab  10/09/20 10:24 Completed


 


 POCT GLUCOSE Stat Lab  10/09/20 15:41 Completed








                       Patient Care Notes (Last 24 hours)





10/09/20 17:13 Case Management Note by Chitra Rivera CALLED TO REPORT THAT PT'S INSURANCE HAS DENIED 

INPATIENT REHAB STAY.  REPORTS THAT THEY FEEL SHE IS BEST SUITED FOR EITHER 

OUTPATIENT P.T. OR HOME WITH [facility] AND P.T.  WILL REPORT TO DR. GIL. 

 DID HAVE DISCUSSION WITH PT.  SHE FEELS THAT SHE IS WEAK, BUT WOULD TRY TO GO 

HOME WITH [facility].  REQUESTS REFERRAL TO [facility] WHEN READY FOR 

DISCHARGE.





Initialized on 10/09/20 17:13 - END OF NOTE








10/09/20 13:36 Nutrition Note by Lupe Dean


F/u Note:


Note Pt diet advanced to soft on 10/8 with 25-50% po intake. no new wt. Most 

labs improved 10/9: K+ 3.2, BUN 21, Cr 1.38, glu 146, alb 2.7, liver enzymes 

wnl, hgb 7.5, hct 24.7. Pt reports severe xerostomia and nausea with attempts to

 eat. Unclear as to pt rec'g med to increase saliva. recommend water with lemon 

slices to aid. NH adm pending. SAVAGE Denny





Initialized on 10/09/20 13:36 - END OF NOTE








10/09/20 11:13 Physical Therapy Note by Naya Martin


PT. REPORTS SHE IS FEELING BETTER.  REPORTS SHE IS PLANNING TO D/C TO [facility] TO CONT. REHAB.  PERFORMED SUPINE TO SIT AND SIT TO SUPINE W/ SBA W/ 

HOB ELEVATED.  SIT TO STAND SBA FROM BED.  PT. AMBULATED 150' W/ SBA.  ONE 

SLIGHT LOB TO RIGHT FOR WHICH PT. NEEDED CGA TO RECOVER.  PT. NEGOTIATED TURN 

WELL WITHOUT LOB.  NOTED SOME DYSPNEA DURING AND AFTER WALK AND DID REST half-way

 THROUGH AND DID SOEM DEEP BREATHING.  O2 SATS 91% ON ROOM AIR AFTER WALK BUT 

INCEASED TO 93% W/ PROPER BREATHING.  WILL CONT. P.T. UNTIL D/C TO INCREASE GAIT

 TOLERANCE AND ENDURANCE TO MAXIMIZE FUNCTIONAL POTENTIAL.





NAYA MARTIN, PT      





Initialized on 10/09/20 11:13 - END OF NOTE








10/09/20 10:52 Case Management Note by Nicolasa Crowder


S/W ESTEVAN AT [facility]- THEY HAVE ACCEPTED PATIENT BUT ARE WAITING ON INSURANCE 

TO APPROVE STAY. THEY ARE ALSO NOT REQUIRING A COVID TEST. 





Initialized on 10/09/20 10:52 - END OF NOTE








10/09/20 10:18 Case Management Note by Nicolasa Crowder PAPERWORK COMPLETE- NO LEVEL II REQUIRED. WILL PLACE COPY ON CHART AND 

PLAN TO FAX COPY TO [facility]





Initialized on 10/09/20 10:18 - END OF NOTE








10/09/20 10:03 Case Management Note by Nicolasa Crowder


LATE AFTERNOON 10/8/20- PATIENT AND FAMILY DECIDED THEY WOULD LIKE HER TO GO TO 

[facility] REHAB AT TIME OF DC. REFERRAL PACKET SENT THIS AM FOR THEM TO START 

INSURANCE PRECERT. 


S/W WITH PATIENT THIS AM- SHE IS STILL AGREEABLE AT THIS TIME. WILL WAIT TO HEAR

 BACK FROM [facility]





Initialized on 10/09/20 10:03 - END OF NOTE











Patient inpatient rehab denied by insurance. They have approved Home health and 

out patient physical therpy. Patient is still very weak, My reccomendation are 

inpatient rehab. 





- Vitals & Intake/Output


Vital Signs: 





                                   Vital Signs











Temperature  98.3 F   10/09/20 15:50


 


Pulse Rate  61   10/09/20 15:50


 


Respiratory Rate  20   10/09/20 15:50


 


Blood Pressure  154/68   10/09/20 15:50


 


O2 Sat by Pulse Oximetry  95   10/09/20 15:50











Intake & Output: 





                                 Intake & Output











 10/07/20 10/08/20 10/09/20 10/10/20





 11:59 11:59 11:59 11:59


 


Intake Total 3555 3986 4676 1688


 


Output Total 2900 1600 1600 1750


 


Balance 655 2386 3076 -62














- Lab


Result Diagrams: 


                                 10/09/20 04:30





                                 10/09/20 04:30


Lab Results-Last 24 Hrs: 





                            Lab Results-Last 24 Hours











  10/08/20 10/09/20 10/09/20 Range/Units





  21:11 04:30 04:30 


 


WBC   7.0   (4.0-10.5)  K/mm3


 


RBC   2.49 L   (4.1-5.4)  M/mm3


 


Hgb   7.5 L   (12.0-16.0)  gm/dl


 


Hct   24.7 L   (35-47)  %


 


MCV   99.2   ()  fl


 


MCH   30.1   (26-32)  pg


 


MCHC   30.4 L   (32-36)  g/dl


 


RDW   16.8 H   (11.5-14.0)  %


 


Plt Count   118 L   (150-450)  K/mm3


 


MPV   12.3 H   (7.5-11.0)  fl


 


Gran %   82.1 H   (36.0-66.0)  %


 


Eos # (Auto)   0.19   (0-0.5)  


 


Absolute Lymphs (auto)   0.71 L   (1.0-4.6)  


 


Absolute Monos (auto)   0.35   (0.0-1.3)  


 


Lymphocytes %   10.1 L   (24.0-44.0)  %


 


Monocytes %   5.0   (0.0-12.0)  %


 


Eosinophils %   2.7   (0.00-5.0)  %


 


Basophils %   0.1   (0.0-0.4)  %


 


Absolute Granulocytes   5.78   (1.4-6.9)  


 


Basophils #   0.01   (0-0.4)  


 


Sodium    145  (137-145)  mmol/L


 


Potassium    3.2 L  (3.5-5.1)  mmol/L


 


Chloride    121 H  ()  mmol/L


 


Carbon Dioxide    15 L*  (22-30)  mmol/L


 


Anion Gap    11.7  (5-15)  MEQ/L


 


BUN    21 H  (7-17)  mg/dL


 


Creatinine    1.38 H  (0.52-1.04)  mg/dL


 


Estimated GFR    39.5  ML/MIN


 


Glucose    146 H  ()  mg/dL


 


POC Glucometer  139 H    (74 to 106)  mg/dL


 


Calcium    8.5  (8.4-10.2)  mg/dL


 


Total Bilirubin    0.40  (0.2-1.3)  mg/dL


 


AST    15  (14-36)  U/L


 


ALT    10  (0-35)  U/L


 


Alkaline Phosphatase    188 H  ()  U/L


 


Serum Total Protein    5.1 L  (6.3-8.2)  g/dL


 


Albumin    2.7 L  (3.5-5.0)  g/dL














  10/09/20 10/09/20 10/09/20 Range/Units





  06:50 10:24 15:41 


 


WBC     (4.0-10.5)  K/mm3


 


RBC     (4.1-5.4)  M/mm3


 


Hgb     (12.0-16.0)  gm/dl


 


Hct     (35-47)  %


 


MCV     ()  fl


 


MCH     (26-32)  pg


 


MCHC     (32-36)  g/dl


 


RDW     (11.5-14.0)  %


 


Plt Count     (150-450)  K/mm3


 


MPV     (7.5-11.0)  fl


 


Gran %     (36.0-66.0)  %


 


Eos # (Auto)     (0-0.5)  


 


Absolute Lymphs (auto)     (1.0-4.6)  


 


Absolute Monos (auto)     (0.0-1.3)  


 


Lymphocytes %     (24.0-44.0)  %


 


Monocytes %     (0.0-12.0)  %


 


Eosinophils %     (0.00-5.0)  %


 


Basophils %     (0.0-0.4)  %


 


Absolute Granulocytes     (1.4-6.9)  


 


Basophils #     (0-0.4)  


 


Sodium     (137-145)  mmol/L


 


Potassium     (3.5-5.1)  mmol/L


 


Chloride     ()  mmol/L


 


Carbon Dioxide     (22-30)  mmol/L


 


Anion Gap     (5-15)  MEQ/L


 


BUN     (7-17)  mg/dL


 


Creatinine     (0.52-1.04)  mg/dL


 


Estimated GFR     ML/MIN


 


Glucose     ()  mg/dL


 


POC Glucometer  135 H  191 H  149 H  (74 to 106)  mg/dL


 


Calcium     (8.4-10.2)  mg/dL


 


Total Bilirubin     (0.2-1.3)  mg/dL


 


AST     (14-36)  U/L


 


ALT     (0-35)  U/L


 


Alkaline Phosphatase     ()  U/L


 


Serum Total Protein     (6.3-8.2)  g/dL


 


Albumin     (3.5-5.0)  g/dL











Micro Results-Entire Visit: 





                                  Microbiology











 10/04/20 06:30 Blood Culture Gram Stain - Final





 Blood    Not Reportable





 Blood Culture - Final





    NO GROWTH


 


 10/03/20 11:02 Urine Culture - Final





 Urine, Void    STREPTOCOCCI SPECIES, BETA HEMOLYTIC  20 CFU/ML





    MOST STREPTOCOCCI SENSITIVE TO PENICILLIN; NO CLARENCE PERFORMED





    RESUBMIT NEW SPECIMEN IF CLINICALLY INDICATED














- Radiology Exams


Ordered Rad Exams-Entire Visit: 





                              Radiology Procedures











 Category Date Time Status


 


 CHEST 1 VIEW (PORTABLE) Urgent Exams  10/09/20 08:10 Completed














- Procedures and Test


Procedures and Tests throughout Hospitalization: 





                            Therapy Orders & Screens





10/03/20 12:25


Oxygen Nasal Cannula 2 lpm 


   Comment: 





10/05/20 08:19


PT Eval & Treat (MD Order) ONCE 


   Reason for Eval:: weakness noted in admission assess


   Diagnosis: DEHYDRATION, ARF














Discharge Exam


General Appearance: no apparent distress, alert


Neurologic Exam: alert, oriented x 3, cooperative, normal mood/affect, nml 

cerebellar function, sensation nml, No motor deficits


Eye Exam: PERRL, EOMI, eyes nml inspection


Ears, Nose, Throat Exam: normal ENT inspection, pharynx normal, moist mucous 

membranes


Neck Exam: normal inspection, non-tender, supple, full range of motion


Respiratory Exam: normal breath sounds, lungs clear, No respiratory distress


Cardiovascular Exam: regular rate/rhythm, normal heart sounds


Gastrointestinal/Abdomen Exam: soft, No tenderness, No mass


Pelvic Exam: deferred


Rectal Exam: deferred


Back Exam: normal inspection, normal range of motion, No CVA tenderness, No 

vertebral tenderness


Extremity Exam: normal inspection, normal range of motion


Skin Exam: normal color, warm, dry





Final Diagnosis/Problem List





- Final Discharge Diagnosis/Problem


(1) Metabolic acidosis with normal anion gap and bicarbonate losses


Current Visit: Yes   Status: Resolved   Code(s): E87.2 - ACIDOSIS   





(2) Pancreatitis


Current Visit: Yes   Status: Resolved   Code(s): K85.90 - ACUTE PANCREATITIS 

WITHOUT NECROSIS OR INFECTION, UNSP   





(3) Acute renal failure syndrome


Current Visit: Yes   Status: Resolved   





(4) Type 2 diabetes mellitus


Current Visit: Yes   Status: Chronic   





(5) Metabolic acidosis due to diabetes mellitus


Current Visit: Yes   Status: Resolved   Code(s): E11.69 - TYPE 2 DIABETES 

MELLITUS WITH OTHER SPECIFIED COMPLICATION; E87.2 - ACIDOSIS   





(6) Hypertension


Current Visit: Yes   Status: Chronic   Code(s): I10 - ESSENTIAL (PRIMARY) 

HYPERTENSION   





(7) Dehydration


Current Visit: Yes   Status: Resolved   Code(s): E86.0 - DEHYDRATION   





- Discharge


Discharge Date: 10/10/20


Disposition: HOME HEALTH SERVICE


Condition: Stable


Prescriptions: 


Continue


   Metformin HCl 1000 mg [Glucophage 1000 MG] 1,000 mg PO BID


   Metoprolol Succinate 50 mg*** [Toprol Xl 50 MG***] 50 mg PO BID


   Aspirin EC 81 mg*** [Ecotrin 81 mg***] 81 mg PO DAILY


   allopurinoL [Allopurinol] 300 mg PO BID


   Ondansetron HCl 4 mg SL Q8H


   Simvastatin 40 mg PO HS





Discontinued


   Lisinopril/Hydrochlorothiazide [Lisinopril-Hctz 10-12.5 mg Tab] 1 tab PO 

DAILY


   Empagliflozin [Jardiance] 10 mg PO QAM


Instructions:  Dehydration, Adult (DC)


Additional Instructions: 


NURSING HOME ORDERS:


SOFT DIET ADVANCE AS TOLERATED


PT/OT EVAL AND TREAT


SEE ATTACHED MED LIST


Follow up with: 


MARCIANO GIL MD [Primary Care Provider] - 10/13/20 3:00 pm

## 2020-10-09 NOTE — XRAY
Indication: Nursing home placement.



Comparison: May 6, 2010.



Portable chest now demonstrates borderline cardiomegaly with vascular

prominence, pulmonary edema, and small bibasilar effusions concerning for

mild/early cardiac decompensation.  Superimposed pneumonia not completely

excluded.  Bony thorax intact.

## 2020-10-09 NOTE — PCM.NOTE
Date and Time: 10/09/20  1040





Subjective Assessment: 





doing better





- Review of Systems


Constitutional: No Fever, No Chills


Eyes: No Symptoms


Ears, Nose, & Throat: No Symptoms


Respiratory: No Cough, No Short Of Breath


Cardiac: No Chest Pain, No Edema, No Syncope


Abdominal/Gastrointestinal: No Abdominal Pain, No Nausea, No Vomiting, No 

Diarrhea


Genitourinary Symptoms: No Dysuria


Musculoskeletal: No Back Pain, No Neck Pain


Skin: No Rash


Neurological: No Dizziness, No Focal Weakness, No Sensory Changes


Psychological: No Symptoms


Endocrine: No Symptoms


Hematologic/Lymphatic: No Symptoms


Immunological/Allergic: No Symptoms





Objective Exam


General Appearance: no apparent distress, alert


Neurologic Exam: alert, oriented x 3, cooperative, normal mood/affect, nml 

cerebellar function, sensation nml, No motor deficits


Skin Exam: normal color, warm, dry


Eye Exam: PERRL, EOMI, eyes nml inspection


Ears, Nose, Throat Exam: normal ENT inspection, pharynx normal, moist mucous 

membranes


Neck Exam: normal inspection, non-tender, supple, full range of motion


Respiratory Exam: normal breath sounds, lungs clear, No respiratory distress


Cardiovascular Exam: regular rate/rhythm, normal heart sounds


Gastrointestinal/Abdomen Exam: soft, No tenderness, No mass


Extremity Exam: normal inspection, normal range of motion


Back Exam: normal inspection, normal range of motion, No CVA tenderness, No 

vertebral tenderness


Pelvic Exam: deferred


Rectal Exam: deferred





OBJECTIVE DATA


Vital Signs: 


                               Vital Signs - 24 hr











  Temp Pulse Resp BP Pulse Ox


 


 10/09/20 07:21  97.8 F  60  20  132/60  96


 


 10/09/20 03:55  98.0 F  64  18  130/60  93 L


 


 10/08/20 23:59  98.6 F  58 L  14  150/72  95


 


 10/08/20 20:00  97.8 F  55 L  24  151/70  95


 


 10/08/20 16:00  97.7 F  53 L  18  158/65  96


 


 10/08/20 11:19  97.5 F  50 L  18  155/72  96








                        Pain Assessment - Last Documented











Pain Intensity                 0


 


Pain Scale Used                0-10 Pain Scale











Intake and Output: 


                                 Intake & Output











 10/06/20 10/07/20 10/08/20 10/09/20





 11:59 11:59 11:59 11:59


 


Intake Total 3450 7945 3986 4676


 


Output Total 4100 2900 1600 1600


 


Balance -300 517 [id number] 3209


 


Weight 80.3 kg   











Lab Results: 


                            Lab Results-Last 24 Hours











  10/08/20 10/08/20 10/08/20 Range/Units





  11:09 16:36 21:11 


 


WBC     (4.0-10.5)  K/mm3


 


RBC     (4.1-5.4)  M/mm3


 


Hgb     (12.0-16.0)  gm/dl


 


Hct     (35-47)  %


 


MCV     ()  fl


 


MCH     (26-32)  pg


 


MCHC     (32-36)  g/dl


 


RDW     (11.5-14.0)  %


 


Plt Count     (150-450)  K/mm3


 


MPV     (7.5-11.0)  fl


 


Gran %     (36.0-66.0)  %


 


Eos # (Auto)     (0-0.5)  


 


Absolute Lymphs (auto)     (1.0-4.6)  


 


Absolute Monos (auto)     (0.0-1.3)  


 


Lymphocytes %     (24.0-44.0)  %


 


Monocytes %     (0.0-12.0)  %


 


Eosinophils %     (0.00-5.0)  %


 


Basophils %     (0.0-0.4)  %


 


Absolute Granulocytes     (1.4-6.9)  


 


Basophils #     (0-0.4)  


 


Sodium     (137-145)  mmol/L


 


Potassium     (3.5-5.1)  mmol/L


 


Chloride     ()  mmol/L


 


Carbon Dioxide     (22-30)  mmol/L


 


Anion Gap     (5-15)  MEQ/L


 


BUN     (7-17)  mg/dL


 


Creatinine     (0.52-1.04)  mg/dL


 


Estimated GFR     ML/MIN


 


Glucose     ()  mg/dL


 


POC Glucometer  140 H  147 H  139 H  (74 to 106)  mg/dL


 


Calcium     (8.4-10.2)  mg/dL


 


Total Bilirubin     (0.2-1.3)  mg/dL


 


AST     (14-36)  U/L


 


ALT     (0-35)  U/L


 


Alkaline Phosphatase     ()  U/L


 


Serum Total Protein     (6.3-8.2)  g/dL


 


Albumin     (3.5-5.0)  g/dL














  10/09/20 10/09/20 10/09/20 Range/Units





  04:30 04:30 06:50 


 


WBC  7.0    (4.0-10.5)  K/mm3


 


RBC  2.49 L    (4.1-5.4)  M/mm3


 


Hgb  7.5 L    (12.0-16.0)  gm/dl


 


Hct  24.7 L    (35-47)  %


 


MCV  99.2    ()  fl


 


MCH  30.1    (26-32)  pg


 


MCHC  30.4 L    (32-36)  g/dl


 


RDW  16.8 H    (11.5-14.0)  %


 


Plt Count  118 L    (150-450)  K/mm3


 


MPV  12.3 H    (7.5-11.0)  fl


 


Gran %  82.1 H    (36.0-66.0)  %


 


Eos # (Auto)  0.19    (0-0.5)  


 


Absolute Lymphs (auto)  0.71 L    (1.0-4.6)  


 


Absolute Monos (auto)  0.35    (0.0-1.3)  


 


Lymphocytes %  10.1 L    (24.0-44.0)  %


 


Monocytes %  5.0    (0.0-12.0)  %


 


Eosinophils %  2.7    (0.00-5.0)  %


 


Basophils %  0.1    (0.0-0.4)  %


 


Absolute Granulocytes  5.78    (1.4-6.9)  


 


Basophils #  0.01    (0-0.4)  


 


Sodium   145   (137-145)  mmol/L


 


Potassium   3.2 L   (3.5-5.1)  mmol/L


 


Chloride   121 H   ()  mmol/L


 


Carbon Dioxide   15 L*   (22-30)  mmol/L


 


Anion Gap   11.7   (5-15)  MEQ/L


 


BUN   21 H   (7-17)  mg/dL


 


Creatinine   1.38 H   (0.52-1.04)  mg/dL


 


Estimated GFR   39.5   ML/MIN


 


Glucose   146 H   ()  mg/dL


 


POC Glucometer    135 H  (74 to 106)  mg/dL


 


Calcium   8.5   (8.4-10.2)  mg/dL


 


Total Bilirubin   0.40   (0.2-1.3)  mg/dL


 


AST   15   (14-36)  U/L


 


ALT   10   (0-35)  U/L


 


Alkaline Phosphatase   188 H   ()  U/L


 


Serum Total Protein   5.1 L   (6.3-8.2)  g/dL


 


Albumin   2.7 L   (3.5-5.0)  g/dL














  10/09/20 Range/Units





  10:24 


 


WBC   (4.0-10.5)  K/mm3


 


RBC   (4.1-5.4)  M/mm3


 


Hgb   (12.0-16.0)  gm/dl


 


Hct   (35-47)  %


 


MCV   ()  fl


 


MCH   (26-32)  pg


 


MCHC   (32-36)  g/dl


 


RDW   (11.5-14.0)  %


 


Plt Count   (150-450)  K/mm3


 


MPV   (7.5-11.0)  fl


 


Gran %   (36.0-66.0)  %


 


Eos # (Auto)   (0-0.5)  


 


Absolute Lymphs (auto)   (1.0-4.6)  


 


Absolute Monos (auto)   (0.0-1.3)  


 


Lymphocytes %   (24.0-44.0)  %


 


Monocytes %   (0.0-12.0)  %


 


Eosinophils %   (0.00-5.0)  %


 


Basophils %   (0.0-0.4)  %


 


Absolute Granulocytes   (1.4-6.9)  


 


Basophils #   (0-0.4)  


 


Sodium   (137-145)  mmol/L


 


Potassium   (3.5-5.1)  mmol/L


 


Chloride   ()  mmol/L


 


Carbon Dioxide   (22-30)  mmol/L


 


Anion Gap   (5-15)  MEQ/L


 


BUN   (7-17)  mg/dL


 


Creatinine   (0.52-1.04)  mg/dL


 


Estimated GFR   ML/MIN


 


Glucose   ()  mg/dL


 


POC Glucometer  191 H  (74 to 106)  mg/dL


 


Calcium   (8.4-10.2)  mg/dL


 


Total Bilirubin   (0.2-1.3)  mg/dL


 


AST   (14-36)  U/L


 


ALT   (0-35)  U/L


 


Alkaline Phosphatase   ()  U/L


 


Serum Total Protein   (6.3-8.2)  g/dL


 


Albumin   (3.5-5.0)  g/dL











Radiology Exams: 


                              Radiology Procedures











 Category Date Time Status


 


 CHEST 1 VIEW (PORTABLE) Urgent Exams  10/09/20 08:10 Completed











Multi-Disciplinary Progress Notes: 


                        Multi-Disciplinary Progress Notes





10/09/20 10:18 Case Management Note by Nicolasa Crowder PAPERWORK COMPLETE- NO LEVEL II REQUIRED. WILL PLACE COPY ON CHART AND 

PLAN TO FAX COPY TO [facility]





Initialized on 10/09/20 10:18 - END OF NOTE








10/09/20 10:03 Case Management Note by Nicolasa Crowder


LATE AFTERNOON 10/8/20- PATIENT AND FAMILY DECIDED THEY WOULD LIKE HER TO GO TO 

[facility] REHAB AT TIME OF DC. REFERRAL PACKET SENT THIS AM FOR THEM TO START 

INSURANCE PRECERT. 


S/W WITH PATIENT THIS AM- SHE IS STILL AGREEABLE AT THIS TIME. WILL WAIT TO HEAR

BACK FROM [facility]





Initialized on 10/09/20 10:03 - END OF NOTE








10/08/20 15:05 Physical Therapy Note by Naya Majano


PT. REPORTS FATIGUE THIS AM.  STATES SHE SLEPT WELL BUT STILL FEELS TIRED ALL OF

THE TIME.  NO C/O PN.  STATES NAUSEA HAS BEEN BETTER BUT REPORTS NOT FEELING 

LIKE EATING.  PT. PERFORMED SUPINE TO SIT W/ SBA W/ HOB ELEVATED.  SIT TO STAND 

SBA.  PT. AMBULATED 120' W/ SBA.  PT. HELD ON TO IV POLE BUT NO INSTABILITY 

NOTED.  PACE OF GAIT IMPROVED TODAY.  PT. PERFORMED SEATED LE EX'S X 10 REPS OF 

LAQS, MARCHES, SLRS, HEEL RAISES, AND ANKLE DF.  O2 SATS 94% ON RA W/ AMBULATION

AND EXERCISE.  WILL CONT. PT UNTIL D/C TO PROGRESS STRENGTHENING AND MAXIMIZE 

FUNCTIONAL POTENTIAL.





NAYA MAJANO, PT





Initialized on 10/08/20 15:05 - END OF NOTE








10/08/20 13:54 Case Management Note by Chitra Rivera


DISCUSSION WITH PT REGARDING NEEDS ON DISCHARGE.  DISCUSSED HOME WITH [facility] 

SERVICES VS REHAB STAY AT [facility].  PT REPORTS THAT SHE WANTS TO TALK WITH HER 

DAUGHTER THIS EVENING, AND WILL MAKE A DECISION.  WILL CHECK BACK WITH PT IN THE

MORNING.  PT C/O SEVERE DRY MOUTH, NO APPETITE, DOESN'T FEEL LIKE EATING OR 

DRINKING.  HAS FLUIDS AT BEDSIDE.  CL IN REACH.  WILL FOLLOW.





Initialized on 10/08/20 13:54 - END OF NOTE

















Assessment/Plan


(1) Metabolic acidosis with normal anion gap and bicarbonate losses


Current Visit: Yes   Status: Acute   


Assessment & Plan: 


improving


Code(s): E87.2 - ACIDOSIS   





(2) Pancreatitis


Current Visit: Yes   Status: Acute   


Qualifiers: 


   Chronicity: acute   Pancreatitis type: unspecified pancreatitis type   Acute 

pancreatitis complication: no infection or necrosis   Qualified Code(s): K85.90 

- Acute pancreatitis without necrosis or infection, unspecified   


Code(s): K85.90 - ACUTE PANCREATITIS WITHOUT NECROSIS OR INFECTION, UNSP   





(3) Acute renal failure syndrome


Current Visit: Yes   Status: Acute   


Qualifiers: 


   Acute renal failure type: unspecified   Qualified Code(s): N17.9 - Acute 

kidney failure, unspecified   





(4) Type 2 diabetes mellitus


Current Visit: Yes   Status: Acute   


Qualifiers: 


   Diabetes mellitus long term insulin use: without long term use   Diabetes 

mellitus complication status: with ketoacidosis   Diabetes mellitus complication

detail: without coma   Qualified Code(s): E11.10 - Type 2 diabetes mellitus with

ketoacidosis without coma   





(5) Metabolic acidosis due to diabetes mellitus


Current Visit: Yes   Status: Acute   Code(s): E11.69 - TYPE 2 DIABETES MELLITUS 

WITH OTHER SPECIFIED COMPLICATION; E87.2 - ACIDOSIS   





(6) Hypertension


Current Visit: Yes   Status: Chronic   


Qualifiers: 


   Hypertension type: essential hypertension   Qualified Code(s): I10 - Ess

ential (primary) hypertension   


Code(s): I10 - ESSENTIAL (PRIMARY) HYPERTENSION   





(7) Dehydration


Current Visit: Yes   Status: Resolved   Code(s): E86.0 - DEHYDRATION

## 2020-10-10 VITALS — OXYGEN SATURATION: 93 % | SYSTOLIC BLOOD PRESSURE: 180 MMHG | HEART RATE: 56 BPM | DIASTOLIC BLOOD PRESSURE: 78 MMHG

## 2020-10-10 RX ADMIN — CEFTRIAXONE SCH MLS/HR: 1 INJECTION, SOLUTION INTRAVENOUS at 09:33

## 2020-10-10 RX ADMIN — METOPROLOL SUCCINATE SCH MG: 50 TABLET, EXTENDED RELEASE ORAL at 09:33

## 2020-10-10 RX ADMIN — PANTOPRAZOLE SODIUM SCH MG: 40 INJECTION, POWDER, FOR SOLUTION INTRAVENOUS at 09:33

## 2020-10-10 RX ADMIN — ASPIRIN SCH MG: 81 TABLET, COATED ORAL at 09:33
